# Patient Record
Sex: MALE | Race: WHITE | Employment: OTHER | URBAN - METROPOLITAN AREA
[De-identification: names, ages, dates, MRNs, and addresses within clinical notes are randomized per-mention and may not be internally consistent; named-entity substitution may affect disease eponyms.]

---

## 2017-01-12 RX ORDER — LISINOPRIL 20 MG/1
20 TABLET ORAL DAILY
Qty: 90 TAB | Refills: 3 | Status: SHIPPED | OUTPATIENT
Start: 2017-01-12 | End: 2018-01-15 | Stop reason: SDUPTHER

## 2017-02-22 ENCOUNTER — OFFICE VISIT (OUTPATIENT)
Dept: FAMILY MEDICINE CLINIC | Age: 82
End: 2017-02-22

## 2017-02-22 VITALS
WEIGHT: 162 LBS | SYSTOLIC BLOOD PRESSURE: 111 MMHG | DIASTOLIC BLOOD PRESSURE: 68 MMHG | HEIGHT: 67 IN | TEMPERATURE: 98.2 F | BODY MASS INDEX: 25.43 KG/M2 | RESPIRATION RATE: 16 BRPM | HEART RATE: 67 BPM

## 2017-02-22 DIAGNOSIS — J06.9 VIRAL UPPER RESPIRATORY TRACT INFECTION: ICD-10-CM

## 2017-02-22 DIAGNOSIS — R53.83 MALAISE AND FATIGUE: ICD-10-CM

## 2017-02-22 DIAGNOSIS — R53.81 MALAISE AND FATIGUE: ICD-10-CM

## 2017-02-22 DIAGNOSIS — R05.9 COUGH: Primary | ICD-10-CM

## 2017-02-22 LAB
QUICKVUE INFLUENZA TEST: NEGATIVE
VALID INTERNAL CONTROL?: YES

## 2017-02-22 RX ORDER — HYDROCODONE POLISTIREX AND CHLORPHENIRAMINE POLISTIREX 10; 8 MG/5ML; MG/5ML
5 SUSPENSION, EXTENDED RELEASE ORAL
Qty: 120 ML | Refills: 0 | Status: SHIPPED | OUTPATIENT
Start: 2017-02-22 | End: 2017-06-21 | Stop reason: ALTCHOICE

## 2017-02-22 NOTE — PATIENT INSTRUCTIONS
Cough: Care Instructions  Your Care Instructions  A cough is your body's response to something that bothers your throat or airways. Many things can cause a cough. You might cough because of a cold or the flu, bronchitis, or asthma. Smoking, postnasal drip, allergies, and stomach acid that backs up into your throat also can cause coughs. A cough is a symptom, not a disease. Most coughs stop when the cause, such as a cold, goes away. You can take a few steps at home to cough less and feel better. Follow-up care is a key part of your treatment and safety. Be sure to make and go to all appointments, and call your doctor if you are having problems. It's also a good idea to know your test results and keep a list of the medicines you take. How can you care for yourself at home? · Drink lots of water and other fluids. This helps thin the mucus and soothes a dry or sore throat. Honey or lemon juice in hot water or tea may ease a dry cough. · Take cough medicine as directed by your doctor. · Prop up your head on pillows to help you breathe and ease a dry cough. · Try cough drops to soothe a dry or sore throat. Cough drops don't stop a cough. Medicine-flavored cough drops are no better than candy-flavored drops or hard candy. · Do not smoke. Avoid secondhand smoke. If you need help quitting, talk to your doctor about stop-smoking programs and medicines. These can increase your chances of quitting for good. When should you call for help? Call 911 anytime you think you may need emergency care. For example, call if:  · You have severe trouble breathing. Call your doctor now or seek immediate medical care if:  · You cough up blood. · You have new or worse trouble breathing. · You have a new or higher fever. · You have a new rash.   Watch closely for changes in your health, and be sure to contact your doctor if:  · You cough more deeply or more often, especially if you notice more mucus or a change in the color of your mucus. · You have new symptoms, such as a sore throat, an earache, or sinus pain. · You do not get better as expected. Where can you learn more? Go to http://sotero-richie.info/. Enter D279 in the search box to learn more about \"Cough: Care Instructions. \"  Current as of: May 27, 2016  Content Version: 11.1  © 2006-2016 [employer]. Care instructions adapted under license by [employer] (which disclaims liability or warranty for this information). If you have questions about a medical condition or this instruction, always ask your healthcare professional. [place] [address] any warranty or liability for your use of this information. Fatigue: Care Instructions  Your Care Instructions  Fatigue is a feeling of tiredness, exhaustion, or lack of energy. You may feel fatigue because of too much or not enough activity. It can also come from stress, lack of sleep, boredom, and poor diet. Many medical problems, such as viral infections, can cause fatigue. Emotional problems, especially depression, are often the cause of fatigue. Fatigue is most often a symptom of another problem. Treatment for fatigue depends on the cause. For example, if you have fatigue because you have a certain health problem, treating this problem also treats your fatigue. If depression or anxiety is the cause, treatment may help. Follow-up care is a key part of your treatment and safety. Be sure to make and go to all appointments, and call your doctor if you are having problems. It's also a good idea to know your test results and keep a list of the medicines you take. How can you care for yourself at home? · Get regular exercise. But don't overdo it. Go back and forth between rest and exercise. · Get plenty of rest.  · Eat a healthy diet. Do not skip meals, especially breakfast.  · Reduce your use of caffeine, tobacco, and alcohol.  Caffeine is most often found in coffee, tea, cola drinks, and chocolate. · Limit medicines that can cause fatigue. This includes tranquilizers and cold and allergy medicines. When should you call for help? Watch closely for changes in your health, and be sure to contact your doctor if:  · You have new symptoms such as fever or a rash. · Your fatigue gets worse. · You have been feeling down, depressed, or hopeless. Or you may have lost interest in things that you usually enjoy. · You are not getting better as expected. Where can you learn more? Go to http://sotero-richie.info/. Enter Y107 in the search box to learn more about \"Fatigue: Care Instructions. \"  Current as of: May 27, 2016  Content Version: 11.1  © 0097-0229 [employer]. Care instructions adapted under license by [employer] (which disclaims liability or warranty for this information). If you have questions about a medical condition or this instruction, always ask your healthcare professional. [place] [address] any warranty or liability for your use of this information.

## 2017-02-22 NOTE — PROGRESS NOTES
1. Have you been to the ER, urgent care clinic since your last visit? Hospitalized since your last visit? No.     2. Have you seen or consulted any other health care providers outside of the [facility] since your last visit? Include any pap smears or colon screening. No.    Patient presents with chesy congestion and cough.

## 2017-02-22 NOTE — PROGRESS NOTES
Chief Complaint   Patient presents with    Nasal Congestion    Cough     HPI:    This is a 81 y/o  patient who presents for the above reason. Started yesterday morning fever with body ache , coughing,  slight headache and chest congestion. Slept poorly last night but states he feel much better today but still coughing persistently. ROS: pertinent positives as noted in HPI. All others were negative      Past Medical History:   Diagnosis Date    BPH (benign prostatic hypertrophy)     Carpal tunnel syndrome     Hypertension      Social History     Social History    Marital status:      Spouse name: N/A    Number of children: N/A    Years of education: N/A     Occupational History    Not on file. Social History Main Topics    Smoking status: Former Smoker     Years: 15.00     Types: Cigarettes     Quit date: 1/1/1965    Smokeless tobacco: Never Used    Alcohol use 0.0 - 3.0 oz/week     0 - 6 Glasses of wine per week    Drug use: No    Sexual activity: No     Other Topics Concern    Not on file     Social History Narrative     Current Outpatient Prescriptions   Medication Sig Dispense Refill    MYRBETRIQ 50 mg ER tablet take 1 tablet by mouth once daily 30 Tab 2    lisinopril (PRINIVIL, ZESTRIL) 20 mg tablet Take 1 Tab by mouth daily. 90 Tab 3    tamsulosin (FLOMAX) 0.4 mg capsule Take 1 Cap by mouth daily (after dinner). 90 Cap 1    amLODIPine (NORVASC) 10 mg tablet Take 1 Tab by mouth daily. 90 Tab 3    fluticasone (FLONASE) 50 mcg/actuation nasal spray 1 [place] by Both Nostrils route daily. 1 Bottle 6    hydrochlorothiazide (HYDRODIURIL) 25 mg tablet Take 25 mg by mouth daily.  ibuprofen 200 mg cap Take 400 mg by mouth two (2) times a day.  aspirin delayed-release 81 mg tablet Take  by mouth daily.  omeprazole (PRILOSEC) 20 mg capsule Take 20 mg by mouth daily.        No Known Allergies    Physical Exam:    Vital Signs:   Visit Vitals    /68    Pulse 67    Temp 98.2 °F (36.8 °C) (Oral)    Resp 16    Ht 5' 7\" (1.702 m)    Wt 162 lb (73.5 kg)    BMI 25.37 kg/m2         General: a, a & o x 3, afebrile,  interacting appropriately, in no acute distress  HEENT: head normocephalic and atraumatic, conjuctiva clear,  Pharynx and tonsils with no erythema or exudates  Respiratory: lung sounds clear bilaterally, good respiratory effort, no wheezes or crackles  Cardiovascular: normal S1S2, regular rate and rhythm, no murmurs      Assessment/Plan:      ICD-10-CM ICD-9-CM    1. Cough R05 786.2 chlorpheniramine-HYDROcodone (TUSSIONEX) 10-8 mg/5 mL suspension   2. Viral upper respiratory tract infection J06.9 465.9 Plenty of rest and keep hydrated    B97.89     3. Malaise and fatigue R53.81 780.79 AMB POC RAPID INFLUENZA TEST - negative    R53.83           Additional Notes: Discussed today's diagnosis, treatment plans. Discussed medication indications and side effects. After Visit Summary: Provided and discussed printed patient instructions. Answered questions in relation to today's diagnosis.   Follow-up Disposition: Follow up as needed if symptoms worsen or fail to improve        Jose E Duncan NP-BC  Family Medicine  [facility] [place]          Orders Placed This Encounter    AMB POC RAPID INFLUENZA TEST    chlorpheniramine-HYDROcodone (TUSSIONEX) 10-8 mg/5 mL suspension

## 2017-02-22 NOTE — MR AVS SNAPSHOT
Visit Information Date & Time Provider Department Dept. Phone Encounter #  
 2/22/2017  2:15 PM Dat Smith NP [facility] [id number] Follow-up Instructions Return if symptoms worsen or fail to improve. Your Appointments 6/12/2017 11:00 AM  
Follow Up with Jacqui Santana MD  
[facility] [address] ([address] [facility]) Appt Note: 6 month f/u  
 [facility] [place] [facility] [address]  
  
   
 [address] [facility]  
  
    
 6/12/2017  2:15 PM  
ESTABLISHED PATIENT with Ingrid Eugene MD  
[facility] [address] [facility]) Appt Note: Return in about 6 months (around 6/12/2017) for PVR, AUA score. [address] [facility], [place] [address] [facility] [address]  
  
   
 [address] [facility], [address] [facility] [address] Upcoming Health Maintenance Date Due DTaP/Tdap/Td series (1 - Tdap) 7/10/1953 ZOSTER VACCINE AGE 60> 7/10/1992 GLAUCOMA SCREENING Q2Y 7/10/1997 Pneumococcal 65+ Low/Medium Risk (1 of 2 - PCV13) 7/10/1997 MEDICARE YEARLY EXAM 12/15/2017 Allergies as of 2/22/2017  Review Complete On: 2/22/2017 By: Amber Lopez No Known Allergies Current Immunizations  Reviewed on 12/14/2016 Name Date Influenza High Dose Vaccine PF 9/22/2016 Not reviewed this visit You Were Diagnosed With   
  
 Codes Comments Cough    -  Primary ICD-10-CM: U45 ICD-9-CM: 366. 2 Viral upper respiratory tract infection     ICD-10-CM: J06.9, B97.89 ICD-9-CM: 465.9 Malaise and fatigue     ICD-10-CM: R53.81, R53.83 ICD-9-CM: 780.79 Vitals BP  
  
  
  
  
  
 111/68 Vitals History BMI and BSA Data Body Mass Index Body Surface Area  
 25.37 kg/m 2 1.86 m 2 Preferred Pharmacy Pharmacy Name Phone  [id number] [facility], [address] [facility] [address] [phone] Your Updated Medication List  
  
   
This list is accurate as of: 2/22/17  2:58 PM.  Always use your most recent med list. amLODIPine 10 mg tablet Commonly known as:  Shu Fraction Take 1 Tab by mouth daily. aspirin delayed-release 81 mg tablet Take  by mouth daily. chlorpheniramine-HYDROcodone 10-8 mg/5 mL suspension Commonly known as:  Keegan [place] Take 5 mL by mouth every twelve (12) hours as needed for Cough. Max Daily Amount: 10 mL. fluticasone 50 mcg/actuation nasal spray Commonly known as:  FLONASE  
1 [place] by Both Nostrils route daily. hydroCHLOROthiazide 25 mg tablet Commonly known as:  HYDRODIURIL Take 25 mg by mouth daily. ibuprofen 200 mg Cap Take 400 mg by mouth two (2) times a day. lisinopril 20 mg tablet Commonly known as:  Sahni [place] Take 1 Tab by mouth daily. MYRBETRIQ 50 mg ER tablet Generic drug:  mirabegron ER  
take 1 tablet by mouth once daily PriLOSEC 20 mg capsule Generic drug:  omeprazole Take 20 mg by mouth daily. tamsulosin 0.4 mg capsule Commonly known as:  FLOMAX Take 1 Cap by mouth daily (after dinner). Prescriptions Printed Refills  
 chlorpheniramine-HYDROcodone (TUSSIONEX) 10-8 mg/5 mL suspension 0 Sig: Take 5 mL by mouth every twelve (12) hours as needed for Cough. Max Daily Amount: 10 mL. Class: Print Route: Oral  
  
We Performed the Following AMB POC RAPID INFLUENZA TEST [95006 CPT(R)] Follow-up Instructions Return if symptoms worsen or fail to improve. Patient Instructions Cough: Care Instructions Your Care Instructions A cough is your body's response to something that bothers your throat or airways. Many things can cause a cough. You might cough because of a cold or the flu, bronchitis, or asthma.  Smoking, postnasal drip, allergies, and stomach acid that backs up into your throat also can cause coughs. A cough is a symptom, not a disease. Most coughs stop when the cause, such as a cold, goes away. You can take a few steps at home to cough less and feel better. Follow-up care is a key part of your treatment and safety. Be sure to make and go to all appointments, and call your doctor if you are having problems. It's also a good idea to know your test results and keep a list of the medicines you take. How can you care for yourself at home? · Drink lots of water and other fluids. This helps thin the mucus and soothes a dry or sore throat. Honey or lemon juice in hot water or tea may ease a dry cough. · Take cough medicine as directed by your doctor. · Prop up your head on pillows to help you breathe and ease a dry cough. · Try cough drops to soothe a dry or sore throat. Cough drops don't stop a cough. Medicine-flavored cough drops are no better than candy-flavored drops or hard candy. · Do not smoke. Avoid secondhand smoke. If you need help quitting, talk to your doctor about stop-smoking programs and medicines. These can increase your chances of quitting for good. When should you call for help? Call 911 anytime you think you may need emergency care. For example, call if: 
· You have severe trouble breathing. Call your doctor now or seek immediate medical care if: 
· You cough up blood. · You have new or worse trouble breathing. · You have a new or higher fever. · You have a new rash. Watch closely for changes in your health, and be sure to contact your doctor if: 
· You cough more deeply or more often, especially if you notice more mucus or a change in the color of your mucus. · You have new symptoms, such as a sore throat, an earache, or sinus pain. · You do not get better as expected. Where can you learn more? Go to http://sotero-richie.info/.  
Enter D2 in the search box to learn more about \"Cough: Care Instructions. \" Current as of: May 27, 2016 Content Version: 11.1 © 6596-1566 [employer]. Care instructions adapted under license by [employer]igo (which disclaims liability or warranty for this information). If you have questions about a medical condition or this instruction, always ask your healthcare professional. [address] any warranty or liability for your use of this information. Fatigue: Care Instructions Your Care Instructions Fatigue is a feeling of tiredness, exhaustion, or lack of energy. You may feel fatigue because of too much or not enough activity. It can also come from stress, lack of sleep, boredom, and poor diet. Many medical problems, such as viral infections, can cause fatigue. Emotional problems, especially depression, are often the cause of fatigue. Fatigue is most often a symptom of another problem. Treatment for fatigue depends on the cause. For example, if you have fatigue because you have a certain health problem, treating this problem also treats your fatigue. If depression or anxiety is the cause, treatment may help. Follow-up care is a key part of your treatment and safety. Be sure to make and go to all appointments, and call your doctor if you are having problems. It's also a good idea to know your test results and keep a list of the medicines you take. How can you care for yourself at home? · Get regular exercise. But don't overdo it. Go back and forth between rest and exercise. · Get plenty of rest. 
· Eat a healthy diet. Do not skip meals, especially breakfast. 
· Reduce your use of caffeine, tobacco, and alcohol. Caffeine is most often found in coffee, tea, cola drinks, and chocolate. · Limit medicines that can cause fatigue. This includes tranquilizers and cold and allergy medicines. When should you call for help? Watch closely for changes in your health, and be sure to contact your doctor if: · You have new symptoms such as fever or a rash. · Your fatigue gets worse. · You have been feeling down, depressed, or hopeless. Or you may have lost interest in things that you usually enjoy. · You are not getting better as expected. Where can you learn more? Go to http://sotero-richie.info/. Enter T408 in the search box to learn more about \"Fatigue: Care Instructions. \" Current as of: May 27, 2016 Content Version: 11.1 © 1266-3363 [employer]. Care instructions adapted under license by [employer] (which disclaims liability or warranty for this information). If you have questions about a medical condition or this instruction, always ask your healthcare professional. [address] any warranty or liability for your use of this information. Introducing [facility]! Miguel Bennett introduces [employer] patient portal. Now you can access parts of your medical record, email your doctor's office, and request medication refills online. 1. In your internet browser, go to [email or web address] 2. Click on the First Time User? Click Here link in the Sign In box. You will see the New Member Sign Up page. 3. Enter your [employer] Access Code exactly as it appears below. You will not need to use this code after youve completed the sign-up process. If you do not sign up before the expiration date, you must request a new code. · [employer] Access Code: [email or web address] Expires: 5/23/2017  2:58 PM 
 
4. Enter the last four digits of your Social Security Number (xxxx) and Date of Birth (mm/dd/yyyy) as indicated and click Submit. You will be taken to the next sign-up page. 5. Create a [employer] ID. This will be your [employer] login ID and cannot be changed, so think of one that is secure and easy to remember. 6. Create a Fundriset password. You can change your password at any time. 7. Enter your Password Reset Question and Answer. This can be used at a later time if you forget your password. 8. Enter your e-mail address. You will receive e-mail notification when new information is available in [address] 9. Click Sign Up. You can now view and download portions of your medical record. 10. Click the Download Summary menu link to download a portable copy of your medical information. If you have questions, please visit the Frequently Asked Questions section of the emaze website. Remember, emaze is NOT to be used for urgent needs. For medical emergencies, dial 911. Now available from your iPhone and Android! Please provide this summary of care documentation to your next provider. Your primary care clinician is listed as Kamini Mcnulty. If you have any questions after today's visit, please call [phone].

## 2017-02-24 ENCOUNTER — OFFICE VISIT (OUTPATIENT)
Dept: FAMILY MEDICINE CLINIC | Age: 82
End: 2017-02-24

## 2017-02-24 VITALS
OXYGEN SATURATION: 90 % | SYSTOLIC BLOOD PRESSURE: 104 MMHG | DIASTOLIC BLOOD PRESSURE: 64 MMHG | TEMPERATURE: 99.8 F | WEIGHT: 163 LBS | RESPIRATION RATE: 16 BRPM | BODY MASS INDEX: 25.58 KG/M2 | HEIGHT: 67 IN | HEART RATE: 78 BPM

## 2017-02-24 DIAGNOSIS — R05.9 COUGH: ICD-10-CM

## 2017-02-24 DIAGNOSIS — J02.9 SORE THROAT: ICD-10-CM

## 2017-02-24 DIAGNOSIS — J02.0 STREP THROAT: ICD-10-CM

## 2017-02-24 DIAGNOSIS — J11.1 INFLUENZA: Primary | ICD-10-CM

## 2017-02-24 DIAGNOSIS — R50.9 FEVER AND CHILLS: ICD-10-CM

## 2017-02-24 LAB
BILIRUB UR QL STRIP: NEGATIVE
GLUCOSE UR-MCNC: NEGATIVE MG/DL
KETONES P FAST UR STRIP-MCNC: NEGATIVE MG/DL
PH UR STRIP: 5 [PH] (ref 4.6–8)
PROT UR QL STRIP: NEGATIVE MG/DL
QUICKVUE INFLUENZA TEST: POSITIVE
S PYO AG THROAT QL: POSITIVE
SP GR UR STRIP: 1.02 (ref 1–1.03)
UA UROBILINOGEN AMB POC: NORMAL (ref 0.2–1)
URINALYSIS CLARITY POC: CLEAR
URINALYSIS COLOR POC: YELLOW
URINE BLOOD POC: NEGATIVE
URINE LEUKOCYTES POC: NEGATIVE
URINE NITRITES POC: NEGATIVE
VALID INTERNAL CONTROL?: YES
VALID INTERNAL CONTROL?: YES

## 2017-02-24 RX ORDER — CEFDINIR 300 MG/1
300 CAPSULE ORAL 2 TIMES DAILY
Qty: 20 CAP | Refills: 0 | Status: SHIPPED | OUTPATIENT
Start: 2017-02-24 | End: 2017-03-06

## 2017-02-24 RX ORDER — OSELTAMIVIR PHOSPHATE 75 MG/1
75 CAPSULE ORAL 2 TIMES DAILY
Qty: 10 CAP | Refills: 0 | Status: SHIPPED | OUTPATIENT
Start: 2017-02-24 | End: 2017-03-01

## 2017-02-24 NOTE — PROGRESS NOTES
1. Have you been to the ER, urgent care clinic since your last visit? Hospitalized since your last visit? No.     2. Have you seen or consulted any other health care providers outside of the [facility] since your last visit? Include any pap smears or colon screening. No.    Patient presents with follow up cough and fever. He is still not feeling any better, still coughing, fever, slight body aches and sore throat.

## 2017-02-24 NOTE — PROGRESS NOTES
.  Chief Complaint   Patient presents with    Follow Up Chronic Condition    Cough    Fever     HPI:    This is a 81 y/o  patient who presents for the above reason. He was seen for this 2 days ago  Symptoms have worsen and have been having fever with chill. Complain of ongoing cough and generalized body ache. No SOB or chest pain. But patient in no respiratory distress but oxygen saturation was low to 88 % but finally came up to 99 %. Plan to obtain CXR to r/o pneumonia. ROS: pertinent positives as noted in HPI. All others were negative      Past Medical History:   Diagnosis Date    BPH (benign prostatic hypertrophy)     Carpal tunnel syndrome     Hypertension      Past Surgical History:   Procedure Laterality Date    HX APPENDECTOMY  1942    HX CARPAL TUNNEL RELEASE       Family History   Problem Relation Age of Onset    Family history unknown: Yes       Social History     Social History    Marital status:      Spouse name: N/A    Number of children: N/A    Years of education: N/A     Occupational History    Not on file. Social History Main Topics    Smoking status: Former Smoker     Years: 15.00     Types: Cigarettes     Quit date: 1/1/1965    Smokeless tobacco: Never Used    Alcohol use 0.0 - 3.0 oz/week     0 - 6 Glasses of wine per week    Drug use: No    Sexual activity: No     Other Topics Concern    Not on file     Social History Narrative     Current Outpatient Prescriptions   Medication Sig Dispense Refill    chlorpheniramine-HYDROcodone (TUSSIONEX) 10-8 mg/5 mL suspension Take 5 mL by mouth every twelve (12) hours as needed for Cough. Max Daily Amount: 10 mL. 120 mL 0    MYRBETRIQ 50 mg ER tablet take 1 tablet by mouth once daily 30 Tab 2    lisinopril (PRINIVIL, ZESTRIL) 20 mg tablet Take 1 Tab by mouth daily. 90 Tab 3    tamsulosin (FLOMAX) 0.4 mg capsule Take 1 Cap by mouth daily (after dinner).  90 Cap 1    amLODIPine (NORVASC) 10 mg tablet Take 1 Tab by mouth daily. 90 Tab 3    fluticasone (FLONASE) 50 mcg/actuation nasal spray 1 [place] by Both Nostrils route daily. 1 Bottle 6    hydrochlorothiazide (HYDRODIURIL) 25 mg tablet Take 25 mg by mouth daily.  ibuprofen 200 mg cap Take 400 mg by mouth two (2) times a day.  omeprazole (PRILOSEC) 20 mg capsule Take 20 mg by mouth daily.  aspirin delayed-release 81 mg tablet Take  by mouth daily. No Known Allergies    Physical Exam:    Vital Signs:     Visit Vitals    /64    Pulse 75    Temp 99.8 °F (37.7 °C) (Oral)    Resp 16    Ht 5' 7\" (1.702 m)    Wt 163 lb (73.9 kg)    SpO2 90%    BMI 25.53 kg/m2         General: a, a & o x 3, afebrile,  interacting appropriately, in no acute distress  HEENT: head normocephalic and atraumatic, conjuctiva clear. Pharynx and tonsils with no erythema or exudates  Respiratory: lung sounds clear bilaterally, good respiratory effort, no wheezes or crackles  Cardiovascular: normal S1S2, regular rate and rhythm, no murmurs   Skin: No rash or lesion. Skin warm and dry to touch. Musculoskeletal: Normal ROM. No deformity      Assessment/Plan:      ICD-10-CM ICD-9-CM    1. Influenza J11.1 487.1 oseltamivir (TAMIFLU) 75 mg capsule   2. Strep throat J02.0 034.0 cefdinir (OMNICEF) 300 mg capsule   3. Fever and chills R50.9 780.60 AMB POC RAPID STREP A- positive      AMB POC RAPID INFLUENZA TEST- positive      XR CHEST PA LAT - negative for pneumonia      AMB POC URINALYSIS DIP STICK AUTO W/O MICRO      cefdinir (OMNICEF) 300 mg capsule   4. Sore throat J02.9 462    5. Cough R05 786.2 XR CHEST PA LAT - negative           Additional Notes: Discussed today's diagnosis, treatment plans. Discussed medication indications and side effects. After Visit Summary: Provided and discussed printed patient instructions. Answered questions in relation to today's diagnosis.   Follow-up Disposition: Follow up  3 days        Tolu Cotton, NP-BC  [facility]        Orders Placed This Encounter    XR CHEST PA LAT    AMB POC RAPID STREP A    AMB POC RAPID INFLUENZA TEST    AMB POC URINALYSIS DIP STICK AUTO W/O MICRO    oseltamivir (TAMIFLU) 75 mg capsule    cefdinir (OMNICEF) 300 mg capsule

## 2017-02-24 NOTE — MR AVS SNAPSHOT
Visit Information Date & Time Provider Department Dept. Phone Encounter #  
 2/24/2017 10:45 AM Musa Nicholas NP [facility] [id number] Follow-up Instructions Return in about 3 days (around 2/27/2017). Your Appointments 6/12/2017 11:00 AM  
Follow Up with MD Apurva Conway 23 ([address]) Appt Note: 6 month f/u  
 [facility] [place] [facility] [address]  
  
   
 [address] [facility]  
  
    
 6/12/2017  2:15 PM  
ESTABLISHED PATIENT with Kinza Hassan MD  
[facility]) Appt Note: Return in about 6 months (around 6/12/2017) for PVR, AUA score. [address], [place] [address]  
  
   
 [address], [address] [facility] [address] Upcoming Health Maintenance Date Due DTaP/Tdap/Td series (1 - Tdap) 7/10/1953 ZOSTER VACCINE AGE 60> 7/10/1992 GLAUCOMA SCREENING Q2Y 7/10/1997 Pneumococcal 65+ Low/Medium Risk (1 of 2 - PCV13) 7/10/1997 MEDICARE YEARLY EXAM 12/15/2017 Allergies as of 2/24/2017  Review Complete On: 2/24/2017 By: Yobani Euceda No Known Allergies Current Immunizations  Reviewed on 12/14/2016 Name Date Influenza High Dose Vaccine PF 9/22/2016 Not reviewed this visit You Were Diagnosed With   
  
 Codes Comments Fever and chills    -  Primary ICD-10-CM: R50.9 ICD-9-CM: 780.60 Strep throat     ICD-10-CM: J02.0 ICD-9-CM: 034.0 Influenza     ICD-10-CM: J11.1 ICD-9-CM: 487. 1 Vitals BP  
  
  
  
  
  
 104/64 Vitals History BMI and BSA Data Body Mass Index Body Surface Area 25.53 kg/m 2 1.87 m 2 Preferred Pharmacy Pharmacy Name Phone [id number], [address] [facility] [address] [phone] Your Updated Medication List  
  
 This list is accurate as of: 2/24/17 11:51 AM.  Always use your most recent med list. amLODIPine 10 mg tablet Commonly known as:  Tyson Brush Take 1 Tab by mouth daily. aspirin delayed-release 81 mg tablet Take  by mouth daily. cefdinir 300 mg capsule Commonly known as:  OMNICEF Take 1 Cap by mouth two (2) times a day for 10 days. chlorpheniramine-HYDROcodone 10-8 mg/5 mL suspension Commonly known as:  Andrew Randle Take 5 mL by mouth every twelve (12) hours as needed for Cough. Max Daily Amount: 10 mL. fluticasone 50 mcg/actuation nasal spray Commonly known as:  FLONASE  
1 [place] by Both Nostrils route daily. hydroCHLOROthiazide 25 mg tablet Commonly known as:  HYDRODIURIL Take 25 mg by mouth daily. ibuprofen 200 mg Cap Take 400 mg by mouth two (2) times a day. lisinopril 20 mg tablet Commonly known as:  Kaila Pate Take 1 Tab by mouth daily. MYRBETRIQ 50 mg ER tablet Generic drug:  mirabegron ER  
take 1 tablet by mouth once daily  
  
 oseltamivir 75 mg capsule Commonly known as:  TAMIFLU Take 1 Cap by mouth two (2) times a day for 5 days. PriLOSEC 20 mg capsule Generic drug:  omeprazole Take 20 mg by mouth daily. tamsulosin 0.4 mg capsule Commonly known as:  FLOMAX Take 1 Cap by mouth daily (after dinner). Prescriptions Sent to Pharmacy Refills  
 oseltamivir (TAMIFLU) 75 mg capsule 0 Sig: Take 1 Cap by mouth two (2) times a day for 5 days. Class: Normal  
 Pharmacy: [address] Ph #: [phone] Route: Oral  
 cefdinir (OMNICEF) 300 mg capsule 0 Sig: Take 1 Cap by mouth two (2) times a day for 10 days. Class: Normal  
 Pharmacy: [address] Ph #: [phone] Route: Oral  
  
We Performed the Following AMB POC RAPID INFLUENZA TEST [39740 CPT(R)] AMB POC RAPID STREP A [95743 CPT(R)] AMB POC URINALYSIS DIP STICK AUTO W/O MICRO [29935 CPT(R)] Follow-up Instructions Return in about 3 days (around 2/27/2017). To-Do List   
 02/24/2017 Imaging:  XR CHEST PA LAT Patient Instructions Influenza (Flu): Care Instructions Your Care Instructions Influenza (flu) is an infection in the lungs and breathing passages. It is caused by the influenza virus. There are different strains, or types, of the flu virus from year to year. Unlike the common cold, the flu comes on suddenly and the symptoms, such as a cough, congestion, fever, chills, fatigue, aches, and pains, are more severe. These symptoms may last up to 10 days. Although the flu can make you feel very sick, it usually doesn't cause serious health problems. Home treatment is usually all you need for flu symptoms. But your doctor may prescribe antiviral medicine to prevent other health problems, such as pneumonia, from developing. Older people and those who have a long-term health condition, such as lung disease, are most at risk for having pneumonia or other health problems. Follow-up care is a key part of your treatment and safety. Be sure to make and go to all appointments, and call your doctor if you are having problems. Its also a good idea to know your test results and keep a list of the medicines you take. How can you care for yourself at home? · Get plenty of rest. 
· Drink plenty of fluids, enough so that your urine is light yellow or clear like water. If you have kidney, heart, or liver disease and have to limit fluids, talk with your doctor before you increase the amount of fluids you drink. · Take an over-the-counter pain medicine if needed, such as acetaminophen (Tylenol), ibuprofen (Advil, Motrin), or naproxen (Aleve), to relieve fever, headache, and muscle aches.  Read and follow all instructions on the label. No one younger than 20 should take aspirin. It has been linked to Reye syndrome, a serious illness. · Do not smoke. Smoking can make the flu worse. If you need help quitting, talk to your doctor about stop-smoking programs and medicines. These can increase your chances of quitting for good. · Breathe moist air from a hot shower or from a sink filled with hot water to help clear a stuffy nose. · Before you use cough and cold medicines, check the label. These medicines may not be safe for young children or for people with certain health problems. · If the skin around your nose and lips becomes sore, put some petroleum jelly on the area. · To ease coughing: ¨ Drink fluids to soothe a scratchy throat. ¨ Suck on cough drops or plain hard candy. ¨ Take an over-the-counter cough medicine that contains dextromethorphan to help you get some sleep. Read and follow all instructions on the label. ¨ Raise your head at night with an extra pillow. This may help you rest if coughing keeps you awake. · Take any prescribed medicine exactly as directed. Call your doctor if you think you are having a problem with your medicine. To avoid spreading the flu · Wash your hands regularly, and keep your hands away from your face. · Stay home from school, work, and other public places until you are feeling better and your fever has been gone for at least 24 hours. The fever needs to have gone away on its own without the help of medicine. · Ask people living with you to talk to their doctors about preventing the flu. They may get antiviral medicine to keep from getting the flu from you. · To prevent the flu in the future, get a flu vaccine every fall. Encourage people living with you to get the vaccine. · Cover your mouth when you cough or sneeze. When should you call for help? Call 911 anytime you think you may need emergency care. For example, call if: 
· You have severe trouble breathing. Call your doctor now or seek immediate medical care if: 
· You have new or worse trouble breathing. · You seem to be getting much sicker. · You feel very sleepy or confused. · You have a new or higher fever. · You get a new rash. Watch closely for changes in your health, and be sure to contact your doctor if: 
· You begin to get better and then get worse. · You are not getting better after 1 week. Where can you learn more? Go to http://sotero-richie.info/. Enter V192 in the search box to learn more about \"Influenza (Flu): Care Instructions. \" Current as of: May 23, 2016 Content Version: 11.1 © 3428-3434 [employer]. Care instructions adapted under license by [employer] (which disclaims liability or warranty for this information). If you have questions about a medical condition or this instruction, always ask your healthcare professional. [place] [address] any warranty or liability for your use of this information. Sore Throat: Care Instructions Your Care Instructions Infection by bacteria or a virus causes most sore throats. Cigarette smoke, dry air, air pollution, allergies, and yelling can also cause a sore throat. Sore throats can be painful and annoying. Fortunately, most sore throats go away on their own. If you have a bacterial infection, your doctor may prescribe antibiotics. Follow-up care is a key part of your treatment and safety. Be sure to make and go to all appointments, and call your doctor if you are having problems. It's also a good idea to know your test results and keep a list of the medicines you take. How can you care for yourself at home? · If your doctor prescribed antibiotics, take them as directed. Do not stop taking them just because you feel better. You need to take the full course of antibiotics.  
· Gargle with warm salt water once an hour to help reduce swelling and relieve discomfort. Use 1 teaspoon of salt mixed in 1 cup of warm water. · Take an over-the-counter pain medicine, such as acetaminophen (Tylenol), ibuprofen (Advil, Motrin), or naproxen (Aleve). Read and follow all instructions on the label. · Be careful when taking over-the-counter cold or flu medicines and Tylenol at the same time. Many of these medicines have acetaminophen, which is Tylenol. Read the labels to make sure that you are not taking more than the recommended dose. Too much acetaminophen (Tylenol) can be harmful. · Drink plenty of fluids. Fluids may help soothe an irritated throat. Hot fluids, such as tea or soup, may help decrease throat pain. · Use over-the-counter throat lozenges to soothe pain. Regular cough drops or hard candy may also help. These should not be given to young children because of the risk of choking. · Do not smoke or allow others to smoke around you. If you need help quitting, talk to your doctor about stop-smoking programs and medicines. These can increase your chances of quitting for good. · Use a vaporizer or humidifier to add moisture to your bedroom. Follow the directions for cleaning the machine. When should you call for help? Call your doctor now or seek immediate medical care if: 
· You have new or worse trouble swallowing. · Your sore throat gets much worse on one side. Watch closely for changes in your health, and be sure to contact your doctor if you do not get better as expected. Where can you learn more? Go to http://sotero-richie.info/. Enter 062 [id number] [phone] in the search box to learn more about \"Sore Throat: Care Instructions. \" Current as of: July 29, 2016 Content Version: 11.1 © 8641-2982 [employer]. Care instructions adapted under license by [employer] (which disclaims liability or warranty for this information).  If you have questions about a medical condition or this instruction, always ask your healthcare professional. [place] [address] any warranty or liability for your use of this information. Introducing [facility]! [address] introduces [employer] patient portal. Now you can access parts of your medical record, email your doctor's office, and request medication refills online. 1. In your internet browser, go to [email or web address] 2. Click on the First Time User? Click Here link in the Sign In box. You will see the New Member Sign Up page. 3. Enter your [employer] Access Code exactly as it appears below. You will not need to use this code after youve completed the sign-up process. If you do not sign up before the expiration date, you must request a new code. · [employer] Access Code: [email or web address] Expires: 5/23/2017  2:58 PM 
 
4. Enter the last four digits of your Social Security Number (xxxx) and Date of Birth (mm/dd/yyyy) as indicated and click Submit. You will be taken to the next sign-up page. 5. Create a [employer] ID. This will be your [employer] login ID and cannot be changed, so think of one that is secure and easy to remember. 6. Create a [employer] password. You can change your password at any time. 7. Enter your Password Reset Question and Answer. This can be used at a later time if you forget your password. 8. Enter your e-mail address. You will receive e-mail notification when new information is available in [address]. 9. Click Sign Up. You can now view and download portions of your medical record. 10. Click the Download Summary menu link to download a portable copy of your medical information. If you have questions, please visit the Frequently Asked Questions section of the [employer] website. Remember, [employer] is NOT to be used for urgent needs. For medical emergencies, dial 911. Now available from your iPhone and Android! Please provide this summary of care documentation to your next provider. Your primary care clinician is listed as Val[facility]. If you have any questions after today's visit, please call [phone].

## 2017-02-27 ENCOUNTER — OFFICE VISIT (OUTPATIENT)
Dept: FAMILY MEDICINE CLINIC | Age: 82
End: 2017-02-27

## 2017-02-27 VITALS
RESPIRATION RATE: 16 BRPM | HEART RATE: 75 BPM | BODY MASS INDEX: 25.27 KG/M2 | WEIGHT: 161 LBS | DIASTOLIC BLOOD PRESSURE: 79 MMHG | TEMPERATURE: 97.7 F | HEIGHT: 67 IN | SYSTOLIC BLOOD PRESSURE: 130 MMHG

## 2017-02-27 DIAGNOSIS — R05.9 COUGH: ICD-10-CM

## 2017-02-27 DIAGNOSIS — R50.9 FEVER AND CHILLS: Primary | ICD-10-CM

## 2017-02-27 DIAGNOSIS — J02.0 ACUTE STREPTOCOCCAL PHARYNGITIS: ICD-10-CM

## 2017-02-27 NOTE — PROGRESS NOTES
1. Have you been to the ER, urgent care clinic since your last visit? Hospitalized since your last visit? No.     2. Have you seen or consulted any other health care providers outside of the [address] [place] [address] since your last visit? Include any pap smears or colon screening. No.    Patient presents with follow up Flu and Strep. He is still coughing up with some phlegm with blood in it.

## 2017-02-27 NOTE — PATIENT INSTRUCTIONS

## 2017-02-27 NOTE — PROGRESS NOTES
.  Chief Complaint   Patient presents with    Follow Up Chronic Condition    Flu     Strep. HPI:    Yesterday forced coughing and came up with some thing he did not like took smart a pic of oit    This is a 81 y/o  patient who presents for follow up fever and URI symptoms. Fever, sore throat resolved and feeling a lot better. Coughing have improved. Concerned he had a blood tinged brown mucous yesterday and this morning when he forced himself to cough. CXR done last week was unremarkable. No chest pain or SOB. ROS: pertinent positives as noted in HPI. All others were negative      Past Medical History:   Diagnosis Date    BPH (benign prostatic hypertrophy)     Carpal tunnel syndrome     Hypertension        Social History     Social History    Marital status:      Spouse name: N/A    Number of children: N/A    Years of education: N/A     Occupational History    Not on file. Social History Main Topics    Smoking status: Former Smoker     Years: 15.00     Types: Cigarettes     Quit date: 1/1/1965    Smokeless tobacco: Never Used    Alcohol use 0.0 - 3.0 oz/week     0 - 6 Glasses of wine per week    Drug use: No    Sexual activity: No     Other Topics Concern    Not on file     Social History Narrative     Current Outpatient Prescriptions   Medication Sig Dispense Refill    oseltamivir (TAMIFLU) 75 mg capsule Take 1 Cap by mouth two (2) times a day for 5 days. 10 Cap 0    cefdinir (OMNICEF) 300 mg capsule Take 1 Cap by mouth two (2) times a day for 10 days. 20 Cap 0    chlorpheniramine-HYDROcodone (TUSSIONEX) 10-8 mg/5 mL suspension Take 5 mL by mouth every twelve (12) hours as needed for Cough. Max Daily Amount: 10 mL. 120 mL 0    MYRBETRIQ 50 mg ER tablet take 1 tablet by mouth once daily 30 Tab 2    lisinopril (PRINIVIL, ZESTRIL) 20 mg tablet Take 1 Tab by mouth daily. 90 Tab 3    tamsulosin (FLOMAX) 0.4 mg capsule Take 1 Cap by mouth daily (after dinner).  90 Cap 1  amLODIPine (NORVASC) 10 mg tablet Take 1 Tab by mouth daily. 90 Tab 3    fluticasone (FLONASE) 50 mcg/actuation nasal spray 1 [place] by Both Nostrils route daily. 1 Bottle 6    hydrochlorothiazide (HYDRODIURIL) 25 mg tablet Take 25 mg by mouth daily.  ibuprofen 200 mg cap Take 400 mg by mouth two (2) times a day.  omeprazole (PRILOSEC) 20 mg capsule Take 20 mg by mouth daily.  aspirin delayed-release 81 mg tablet Take  by mouth daily. No Known Allergies    Physical Exam:    Vital Signs:     Visit Vitals    /79    Pulse 75    Temp 97.7 °F (36.5 °C) (Oral)    Resp 16    Ht 5' 7\" (1.702 m)    Wt 161 lb (73 kg)    BMI 25.22 kg/m2         General: a, a & o x 3, afebrile,  interacting appropriately, in no acute distress  HEENT: head normocephalic and atraumatic, conjuctiva clear. Pharynx and tonsils with no erythema or exudates  Respiratory: lung sounds clear bilaterally, good respiratory effort, no wheezes or crackles  Cardiovascular: normal S1S2, regular rate and rhythm, no murmurs   Skin: No rash or lesion. Skin warm and dry to touch. Musculoskeletal: Normal ROM. No deformity      Assessment/Plan:      ICD-10-CM ICD-9-CM    1. Fever and chills R50.9 780.60 Resolved   2. Acute streptococcal pharyngitis J02.0 034. 0 Symptoms improved. 3. Cough R05 786.2 Patient advised to continue taking cough med as prescribed. Patient reassured blood tinged mucous is possibly from forceful coughing and URI symptoms. If blood tinged mucus worsen, he was advised to return for possible chest imaging. Additional Notes: Discussed today's diagnosis, treatment plans. Discussed medication indications and side effects. After Visit Summary: Provided and discussed printed patient instructions. Answered questions in relation to today's diagnosis.   Follow-up Disposition: Follow up as needed          Aneudy Friday, NP-BC  [address] [facility],[address]

## 2017-02-27 NOTE — MR AVS SNAPSHOT
Visit Information Date & Time Provider Department Dept. Phone Encounter #  
 2/27/2017 11:30 AM Carlito Mohr, [address] [facility] [phone] [id number] Your Appointments 6/12/2017 11:00 AM  
Follow Up with La Persaud MD  
[address] ([facility]) Appt Note: 6 month f/u  
 [place]  
  
   
 [address]  
  
    
 6/12/2017  2:15 PM  
ESTABLISHED PATIENT with Mahesh Jung MD  
[facility]) Appt Note: Return in about 6 months (around 6/12/2017) for PVR, AUA score. [address], [place] [address]  
  
   
 [address], [address] [address] Upcoming Health Maintenance Date Due DTaP/Tdap/Td series (1 - Tdap) 7/10/1953 ZOSTER VACCINE AGE 60> 7/10/1992 GLAUCOMA SCREENING Q2Y 7/10/1997 Pneumococcal 65+ Low/Medium Risk (1 of 2 - PCV13) 7/10/1997 MEDICARE YEARLY EXAM 12/15/2017 Allergies as of 2/27/2017  Review Complete On: 2/27/2017 By: Nilam Valdes No Known Allergies Current Immunizations  Reviewed on 12/14/2016 Name Date Influenza High Dose Vaccine PF 9/22/2016 Not reviewed this visit You Were Diagnosed With   
  
 Codes Comments Acute streptococcal pharyngitis    -  Primary ICD-10-CM: J02.0 ICD-9-CM: 034.0 Vitals BP  
  
  
  
  
  
 130/79 Vitals History BMI and BSA Data Body Mass Index Body Surface Area  
 25.22 kg/m 2 1.86 m 2 Preferred Pharmacy Pharmacy Name Phone [id number] 914, [address] [facility] [phone] Your Updated Medication List  
  
   
This list is accurate as of: 2/27/17 11:39 AM.  Always use your most recent med list. amLODIPine 10 mg tablet Commonly known as:  Ranulfo Levine Take 1 Tab by mouth daily. aspirin delayed-release 81 mg tablet Take  by mouth daily. cefdinir 300 mg capsule Commonly known as:  OMNICEF Take 1 Cap by mouth two (2) times a day for 10 days. chlorpheniramine-HYDROcodone 10-8 mg/5 mL suspension Commonly known as:  Floyce Christelle Take 5 mL by mouth every twelve (12) hours as needed for Cough. Max Daily Amount: 10 mL. fluticasone 50 mcg/actuation nasal spray Commonly known as:  FLONASE  
1 [place] by Both Nostrils route daily. hydroCHLOROthiazide 25 mg tablet Commonly known as:  HYDRODIURIL Take 25 mg by mouth daily. ibuprofen 200 mg Cap Take 400 mg by mouth two (2) times a day. lisinopril 20 mg tablet Commonly known as:  Donnald Code Take 1 Tab by mouth daily. MYRBETRIQ 50 mg ER tablet Generic drug:  mirabegron ER  
take 1 tablet by mouth once daily  
  
 oseltamivir 75 mg capsule Commonly known as:  TAMIFLU Take 1 Cap by mouth two (2) times a day for 5 days. PriLOSEC 20 mg capsule Generic drug:  omeprazole Take 20 mg by mouth daily. tamsulosin 0.4 mg capsule Commonly known as:  FLOMAX Take 1 Cap by mouth daily (after dinner). Patient Instructions Upper Respiratory Infection (Cold): Care Instructions Your Care Instructions An upper respiratory infection, or URI, is an infection of the nose, sinuses, or throat. URIs are spread by coughs, sneezes, and direct contact. The common cold is the most frequent kind of URI. The flu and sinus infections are other kinds of URIs. Almost all URIs are caused by viruses. Antibiotics won't cure them. But you can treat most infections with home care. This may include drinking lots of fluids and taking over-the-counter pain medicine. You will probably feel better in 4 to 10 days. The doctor has checked you carefully, but problems can develop later.  If you notice any problems or new symptoms, get medical treatment right away. Follow-up care is a key part of your treatment and safety. Be sure to make and go to all appointments, and call your doctor if you are having problems. It's also a good idea to know your test results and keep a list of the medicines you take. How can you care for yourself at home? · To prevent dehydration, drink plenty of fluids, enough so that your urine is light yellow or clear like water. Choose water and other caffeine-free clear liquids until you feel better. If you have kidney, heart, or liver disease and have to limit fluids, talk with your doctor before you increase the amount of fluids you drink. · Take an over-the-counter pain medicine, such as acetaminophen (Tylenol), ibuprofen (Advil, Motrin), or naproxen (Aleve). Read and follow all instructions on the label. · Before you use cough and cold medicines, check the label. These medicines may not be safe for young children or for people with certain health problems. · Be careful when taking over-the-counter cold or flu medicines and Tylenol at the same time. Many of these medicines have acetaminophen, which is Tylenol. Read the labels to make sure that you are not taking more than the recommended dose. Too much acetaminophen (Tylenol) can be harmful. · Get plenty of rest. 
· Do not smoke or allow others to smoke around you. If you need help quitting, talk to your doctor about stop-smoking programs and medicines. These can increase your chances of quitting for good. When should you call for help? Call 911 anytime you think you may need emergency care. For example, call if: 
· You have severe trouble breathing. Call your doctor now or seek immediate medical care if: 
· You seem to be getting much sicker. · You have new or worse trouble breathing. · You have a new or higher fever. · You have a new rash.  
Watch closely for changes in your health, and be sure to contact your doctor if: 
· You have a new symptom, such as a sore throat, an earache, or sinus pain. · You cough more deeply or more often, especially if you notice more mucus or a change in the color of your mucus. · You do not get better as expected. Where can you learn more? Go to http://sotero-richie.info/. Enter W195 in the search box to learn more about \"Upper Respiratory Infection (Cold): Care Instructions. \" Current as of: June 30, 2016 Content Version: 11.1 © 8910-9875 Texture[employer]. Care instructions adapted under license by My[employer] (which disclaims liability or warranty for this information). If you have questions about a medical condition or this instruction, always ask your healthcare professional. [address] any warranty or liability for your use of this information. Introducing [facility] & HEALTH SERVICES! Afia Manuel introduces [employer] patient portal. Now you can access parts of your medical record, email your doctor's office, and request medication refills online. 1. In your internet browser, go to [email or web address]. [email or web address] [email or web address] 2. Click on the First Time User? Click Here link in the Sign In box. You will see the New Member Sign Up page. 3. Enter your [employer] Access Code exactly as it appears below. You will not need to use this code after youve completed the sign-up process. If you do not sign up before the expiration date, you must request a new code. · [employer] Access Code: [email or web address] Expires: 5/23/2017  2:58 PM 
 
4. Enter the last four digits of your Social Security Number (xxxx) and Date of Birth (mm/dd/yyyy) as indicated and click Submit. You will be taken to the next sign-up page. 5. Create a PoshVinet ID. This will be your [employer] login ID and cannot be changed, so think of one that is secure and easy to remember. 6. Create a PoshVinet password. You can change your password at any time. 7. Enter your Password Reset Question and Answer. This can be used at a later time if you forget your password. 8. Enter your e-mail address. You will receive e-mail notification when new information is available in [address] 9. Click Sign Up. You can now view and download portions of your medical record. 10. Click the Download Summary menu link to download a portable copy of your medical information. If you have questions, please visit the Frequently Asked Questions section of the [employer] website. Remember, [employer] is NOT to be used for urgent needs. For medical emergencies, dial 911. Now available from your iPhone and Android! Please provide this summary of care documentation to your next provider. Your primary care clinician is listed as Eliseo Young. If you have any questions after today's visit, please call [phone].

## 2017-07-25 ENCOUNTER — OFFICE VISIT (OUTPATIENT)
Dept: FAMILY MEDICINE CLINIC | Age: 82
End: 2017-07-25

## 2017-07-25 VITALS
RESPIRATION RATE: 16 BRPM | BODY MASS INDEX: 25.43 KG/M2 | DIASTOLIC BLOOD PRESSURE: 87 MMHG | HEART RATE: 79 BPM | OXYGEN SATURATION: 94 % | TEMPERATURE: 97.6 F | SYSTOLIC BLOOD PRESSURE: 140 MMHG | HEIGHT: 67 IN | WEIGHT: 162 LBS

## 2017-07-25 DIAGNOSIS — I10 ESSENTIAL HYPERTENSION WITH GOAL BLOOD PRESSURE LESS THAN 140/90: Primary | ICD-10-CM

## 2017-07-25 DIAGNOSIS — K21.9 GASTROESOPHAGEAL REFLUX DISEASE WITHOUT ESOPHAGITIS: ICD-10-CM

## 2017-07-25 DIAGNOSIS — E78.5 HYPERLIPIDEMIA, UNSPECIFIED HYPERLIPIDEMIA TYPE: ICD-10-CM

## 2017-07-25 RX ORDER — PHENOL/SODIUM PHENOLATE
20 AEROSOL, SPRAY (ML) MUCOUS MEMBRANE DAILY
Qty: 90 TAB | Refills: 1 | Status: SHIPPED | OUTPATIENT
Start: 2017-07-25 | End: 2018-01-25 | Stop reason: SDUPTHER

## 2017-07-25 NOTE — PATIENT INSTRUCTIONS

## 2017-07-25 NOTE — MR AVS SNAPSHOT
Visit Information Date & Time Provider Department Dept. Phone Encounter #  
 7/25/2017  1:15 PM Pieter Saucedo MD [address] [id number] Follow-up Instructions Return for previous appt. Your Appointments 12/12/2017  9:00 AM  
Office Visit with Pieter Saucedo MD  
[facility] ([address]) Appt Note: mwv  
 [address] [address] [place]  
  
   
 [address] 6/19/2018  3:20 PM  
ESTABLISHED PATIENT with CIARA Anthony [facility] ([address]) Appt Note: Return in about 1 year (around 6/21/2018) for BPH (AUA/PVR) on the PA schedule. [address] [facility], [place] [address]  
  
   
 [address] [facility], [address] [place] [address] Upcoming Health Maintenance Date Due DTaP/Tdap/Td series (1 - Tdap) 7/10/1953 ZOSTER VACCINE AGE 60> 5/10/1992 GLAUCOMA SCREENING Q2Y 7/10/1997 Pneumococcal 65+ Low/Medium Risk (1 of 2 - PCV13) 7/10/1997 INFLUENZA AGE 9 TO ADULT 8/1/2017 MEDICARE YEARLY EXAM 12/15/2017 Allergies as of 7/25/2017  Review Complete On: 7/25/2017 By: Pieter Saucedo MD  
 No Known Allergies Current Immunizations  Reviewed on 12/14/2016 Name Date Influenza High Dose Vaccine PF 9/22/2016 Not reviewed this visit You Were Diagnosed With   
  
 Codes Comments Gastroesophageal reflux disease without esophagitis    -  Primary ICD-10-CM: K21.9 ICD-9-CM: 530.81 Essential hypertension with goal blood pressure less than 140/90     ICD-10-CM: I10 
ICD-9-CM: 401.9 Hyperlipidemia, unspecified hyperlipidemia type     ICD-10-CM: E78.5 ICD-9-CM: 272.4 Vitals BP Pulse Temp Resp Height(growth percentile) Weight(growth percentile) 140/87 79 97.6 °F (36.4 °C) (Oral) 16 5' 7\" (1.702 m) 162 lb (73.5 kg) SpO2 BMI Smoking Status 94% 25.37 kg/m2 Former Smoker BMI and BSA Data Body Mass Index Body Surface Area  
 25.37 kg/m 2 1.86 m 2 Preferred Pharmacy [facility] Phone [id number] [address] [facility] 310 [address] [facility] [phone] Your Updated Medication List  
  
   
This list is accurate as of: 7/25/17  1:42 PM.  Always use your most recent med list. amLODIPine 10 mg tablet Commonly known as:  Remonia Grates Take 1 Tab by mouth daily. aspirin delayed-release 81 mg tablet Take  by mouth daily. ibuprofen 200 mg Cap Take 400 mg by mouth two (2) times a day. lisinopril 20 mg tablet Commonly known as:  Charlie Littler Take 1 Tab by mouth daily. MYRBETRIQ 50 mg ER tablet Generic drug:  mirabegron ER  
take 1 tablet by mouth once daily Omeprazole delayed release 20 mg tablet Commonly known as:  PRILOSEC D/R Take 1 Tab by mouth daily. tamsulosin 0.4 mg capsule Commonly known as:  FLOMAX  
take 1 capsule by mouth once daily AFTER DINNER Prescriptions Sent to Pharmacy Refills Omeprazole delayed release (PRILOSEC D/R) 20 mg tablet 1 Sig: Take 1 Tab by mouth daily. Class: Normal  
 Pharmacy: [facility] [address] [place] [address] [address] [facility] #: [phone] Route: Oral  
  
Follow-up Instructions Return for previous appt. Patient Instructions Gastroesophageal Reflux Disease (GERD): Care Instructions Your Care Instructions Gastroesophageal reflux disease (GERD) is the backward flow of stomach acid into the esophagus. The esophagus is the tube that leads from your throat to your stomach. A one-way valve prevents the stomach acid from moving up into this tube. When you have GERD, this valve does not close tightly enough.  
If you have mild GERD symptoms including heartburn, you may be able to control the problem with antacids or over-the-counter medicine. Changing your diet, losing weight, and making other lifestyle changes can also help reduce symptoms. Follow-up care is a key part of your treatment and safety. Be sure to make and go to all appointments, and call your doctor if you are having problems. Its also a good idea to know your test results and keep a list of the medicines you take. How can you care for yourself at home? · Take your medicines exactly as prescribed. Call your doctor if you think you are having a problem with your medicine. · Your doctor may recommend over-the-counter medicine. For mild or occasional indigestion, antacids, such as Tums, Gaviscon, Mylanta, or Maalox, may help. Your doctor also may recommend over-the-counter acid reducers, such as Pepcid AC, Tagamet HB, Zantac 75, or Prilosec. Read and follow all instructions on the label. If you use these medicines often, talk with your doctor. · Change your eating habits. ¨ Its best to eat several small meals instead of two or three large meals. ¨ After you eat, wait 2 to 3 hours before you lie down. ¨ Chocolate, mint, and alcohol can make GERD worse. ¨ Spicy foods, foods that have a lot of acid (like tomatoes and oranges), and coffee can make GERD symptoms worse in some people. If your symptoms are worse after you eat a certain food, you may want to stop eating that food to see if your symptoms get better. · Do not smoke or chew tobacco. Smoking can make GERD worse. If you need help quitting, talk to your doctor about stop-smoking programs and medicines. These can increase your chances of quitting for good. · If you have GERD symptoms at night, raise the head of your bed 6 to 8 inches by putting the frame on blocks or placing a foam wedge under the head of your mattress. (Adding extra pillows does not work.) · Do not wear tight clothing around your middle. · Lose weight if you need to. Losing just 5 to 10 pounds can help. When should you call for help? Call your doctor now or seek immediate medical care if: 
· You have new or different belly pain. · Your stools are black and tarlike or have streaks of blood. Watch closely for changes in your health, and be sure to contact your doctor if: 
· Your symptoms have not improved after 2 days. · Food seems to catch in your throat or chest. 
Where can you learn more? Go to http://sotero-richie.info/. Enter F324 in the search box to learn more about \"Gastroesophageal Reflux Disease (GERD): Care Instructions. \" Current as of: August 9, 2016 Content Version: 11.3 © 1398-8238 Loopcam. Care instructions adapted under license by [employer] (which disclaims liability or warranty for this information). If you have questions about a medical condition or this instruction, always ask your healthcare professional. [place] [address] any warranty or liability for your use of this information. Introducing [facility] & HEALTH SERVICES! [employer] introduces [employer] patient portal. Now you can access parts of your medical record, email your doctor's office, and request medication refills online. 1. In your internet browser, go to [email or web address]. [employer] 2. Click on the First Time User? Click Here link in the Sign In box. You will see the New Member Sign Up page. 3. Enter your [employer] Access Code exactly as it appears below. You will not need to use this code after youve completed the sign-up process. If you do not sign up before the expiration date, you must request a new code. · [employer] Access Code: [email or web address] Expires: 9/19/2017  4:35 PM 
 
4. Enter the last four digits of your Social Security Number (xxxx) and Date of Birth (mm/dd/yyyy) as indicated and click Submit. You will be taken to the next sign-up page. 5. Create a CrowdCurity ID. This will be your [employer] login ID and cannot be changed, so think of one that is secure and easy to remember. 6. Create a [employer] password. You can change your password at any time. 7. Enter your Password Reset Question and Answer. This can be used at a later time if you forget your password. 8. Enter your e-mail address. You will receive e-mail notification when new information is available in [address] 9. Click Sign Up. You can now view and download portions of your medical record. 10. Click the Download Summary menu link to download a portable copy of your medical information. If you have questions, please visit the Frequently Asked Questions section of the [employer] website. Remember, [employer] is NOT to be used for urgent needs. For medical emergencies, dial 911. Now available from your iPhone and Android! Please provide this summary of care documentation to your next provider. Your primary care clinician is listed as Princess Neal. If you have any questions after today's visit, please call [phone].

## 2017-07-25 NOTE — PROGRESS NOTES
1. Have you been to the ER, urgent care clinic since your last visit? Hospitalized since your last visit? No    2. Have you seen or consulted any other health care providers outside of the [address] [place] [address] since your last visit? Include any pap smears or colon screening.  Yes patient saw urologist in July 1st 2017      Patient here for 6 month follow-up

## 2017-07-25 NOTE — PROGRESS NOTES
Chief Complaint   Patient presents with    Follow-up     6 month       Pt is a 80y.o. year old male who presents for follow up of his chronic medical problems    Health Maintenance Due   Topic Date Due    DTaP/Tdap/Td series (1 - Tdap) 07/10/1953    ZOSTER VACCINE AGE 60>  05/10/1992    GLAUCOMA SCREENING Q2Y  07/10/1997    Pneumococcal 65+ Low/Medium Risk (1 of 2 - PCV13) 07/10/1997       Sees Urology-on 2 meds; Flomax and Myrbetriq      BP Readings from Last 3 Encounters:   07/25/17 140/87   06/21/17 118/64   02/27/17 130/79   Compliant with Norvasc and Lisinopril    Lab Results   Component Value Date/Time    Cholesterol, total 218 12/14/2016 11:28 AM    HDL Cholesterol 66 12/14/2016 11:28 AM    LDL, calculated 132 12/14/2016 11:28 AM    VLDL, calculated 20 12/14/2016 11:28 AM    Triglyceride 100 12/14/2016 11:28 AM    CHOL/HDL Ratio 3.3 12/14/2016 11:28 AM     Was taking Prilosec-needs refill; has not had it in a while in over 6 months  Does a lot of burping, heartburn when he bends over    Problems Age related he says-hurts, walks crooked; once he gets moving he is better; still plays golf regularly    ROS:    Pt denies: Wt loss, Fever/Chills, HA, Visual changes, Fatigue, Chest pain, SOB, PENA, Abd pain, N/V/D/C, Blood in stool or urine, Edema. Pertinent positive as above in HPI.  All others were negative    Patient Active Problem List   Diagnosis Code    OAB (overactive bladder) N32.81    Essential hypertension with goal blood pressure less than 140/90 I10    Gastroesophageal reflux disease K21.9    Allergic rhinitis J30.9    Benign non-nodular prostatic hyperplasia N40.0    Diastasis recti M62.08    S/p bilateral carpal tunnel release Z98.890    Advance directive discussed with patient Z71.89       Past Medical History:   Diagnosis Date    BPH (benign prostatic hypertrophy)     Carpal tunnel syndrome     Hypertension        Current Outpatient Prescriptions   Medication Sig Dispense Refill     tamsulosin (FLOMAX) 0.4 mg capsule take 1 capsule by mouth once daily AFTER DINNER 90 Cap 1    MYRBETRIQ 50 mg ER tablet take 1 tablet by mouth once daily 30 Tab 2    lisinopril (PRINIVIL, ZESTRIL) 20 mg tablet Take 1 Tab by mouth daily. 90 Tab 3    amLODIPine (NORVASC) 10 mg tablet Take 1 Tab by mouth daily. 90 Tab 3    ibuprofen 200 mg cap Take 400 mg by mouth two (2) times a day.  aspirin delayed-release 81 mg tablet Take  by mouth daily. History   Smoking Status    Former Smoker    Years: 15.00    Types: Cigarettes    Quit date: 1/1/1965   Smokeless Tobacco    Former User       No Known Allergies    Patient Labs were reviewed: yes      Patient Past Records were reviewed:  yes        Objective:     Vitals:    07/25/17 1313   BP: 140/87   Pulse: 79   Resp: 16   Temp: 97.6 °F (36.4 °C)   TempSrc: Oral   SpO2: 94%   Weight: 162 lb (73.5 kg)   Height: 5' 7\" (1.702 m)     Body mass index is 25.37 kg/(m^2). Exam:   Appearance: alert, well appearing,  oriented to person, place, and time, acyanotic, in no respiratory distress and well hydrated. HEENT:  NC/AT, pink conj, anicteric sclerae  Neck:  No cervical lymphadenopathy, no JVD, no thyromegaly, no carotid bruit  Heart:  RRR without M/R/G  Lungs:  CTAB, no rhonchi, rales, or wheezes with good air exchange   Abdomen:  Non-tender, pos bowel sounds, no hepatosplenomegaly  Ext:  No C/C/E    Skin: no rash  Neuro: no lateralizing signs, CNs II-XII intact      Assessment/ Plan:   Diagnoses and all orders for this visit:    1. Essential hypertension with goal blood pressure less than 140/90-controlled, continue with present meds    2. Hyperlipidemia, unspecified hyperlipidemia type-discussed low fat diet, recheck next visit/fasting    3. Gastroesophageal reflux disease without esophagitis-discussed lifestyle changes; will take the PPI daily for 6 weeks then prn  -     Omeprazole delayed release (PRILOSEC D/R) 20 mg tablet;  Take 1 Tab by mouth daily. Continue follow up with urology for BPH      Follow-up Disposition:  Return for previous appt. -wellness visit/follow up then        I have discussed the diagnosis with the patient and the intended plan as seen in the above orders. The patient has received an After-Visit Summary and questions were answered concerning future plans. Medication Side Effects and Warnings were discussed with patient: yes    Patient verbalized understanding of above instructions.     Belkis Stevens MD  Internal Medicine  [facility]

## 2017-11-10 RX ORDER — AMLODIPINE BESYLATE 10 MG/1
10 TABLET ORAL DAILY
Qty: 90 TAB | Refills: 3 | Status: SHIPPED | OUTPATIENT
Start: 2017-11-10 | End: 2018-11-12 | Stop reason: SDUPTHER

## 2017-12-12 ENCOUNTER — HOSPITAL ENCOUNTER (OUTPATIENT)
Dept: LAB | Age: 82
Discharge: HOME | End: 2017-12-12
Payer: MEDICARE

## 2017-12-12 ENCOUNTER — OFFICE VISIT (OUTPATIENT)
Dept: FAMILY MEDICINE CLINIC | Age: 82
End: 2017-12-12

## 2017-12-12 VITALS
BODY MASS INDEX: 24.33 KG/M2 | RESPIRATION RATE: 18 BRPM | DIASTOLIC BLOOD PRESSURE: 66 MMHG | SYSTOLIC BLOOD PRESSURE: 121 MMHG | OXYGEN SATURATION: 95 % | HEART RATE: 51 BPM | TEMPERATURE: 96.5 F | WEIGHT: 155 LBS | HEIGHT: 67 IN

## 2017-12-12 DIAGNOSIS — N40.0 BENIGN NON-NODULAR PROSTATIC HYPERPLASIA: ICD-10-CM

## 2017-12-12 DIAGNOSIS — I10 ESSENTIAL HYPERTENSION WITH GOAL BLOOD PRESSURE LESS THAN 140/90: ICD-10-CM

## 2017-12-12 DIAGNOSIS — R25.2 LEG CRAMPS: ICD-10-CM

## 2017-12-12 DIAGNOSIS — K52.9 ACUTE GASTROENTERITIS: ICD-10-CM

## 2017-12-12 DIAGNOSIS — Z00.00 MEDICARE ANNUAL WELLNESS VISIT, SUBSEQUENT: Primary | ICD-10-CM

## 2017-12-12 DIAGNOSIS — R14.2 BURPING: ICD-10-CM

## 2017-12-12 DIAGNOSIS — M72.2 PLANTAR FASCIITIS OF RIGHT FOOT: ICD-10-CM

## 2017-12-12 DIAGNOSIS — E55.9 VITAMIN D DEFICIENCY: ICD-10-CM

## 2017-12-12 DIAGNOSIS — M19.049 OSTEOARTHRITIS OF FINGER, UNSPECIFIED LATERALITY: ICD-10-CM

## 2017-12-12 DIAGNOSIS — K21.9 GASTROESOPHAGEAL REFLUX DISEASE, ESOPHAGITIS PRESENCE NOT SPECIFIED: ICD-10-CM

## 2017-12-12 DIAGNOSIS — Z71.89 ADVANCE DIRECTIVE DISCUSSED WITH PATIENT: ICD-10-CM

## 2017-12-12 LAB
25(OH)D3 SERPL-MCNC: 23.2 NG/ML (ref 30–100)
ALBUMIN SERPL-MCNC: 3.9 G/DL (ref 3.4–5)
ALBUMIN/GLOB SERPL: 1.1 {RATIO} (ref 0.8–1.7)
ALP SERPL-CCNC: 70 U/L (ref 45–117)
ALT SERPL-CCNC: 24 U/L (ref 16–61)
ANION GAP SERPL CALC-SCNC: 9 MMOL/L (ref 3–18)
AST SERPL-CCNC: 21 U/L (ref 15–37)
BASOPHILS # BLD: 0 K/UL (ref 0–0.06)
BASOPHILS NFR BLD: 1 % (ref 0–2)
BILIRUB SERPL-MCNC: 0.9 MG/DL (ref 0.2–1)
BUN SERPL-MCNC: 18 MG/DL (ref 7–18)
BUN/CREAT SERPL: 16 (ref 12–20)
CALCIUM SERPL-MCNC: 9.1 MG/DL (ref 8.5–10.1)
CHLORIDE SERPL-SCNC: 101 MMOL/L (ref 100–108)
CHOLEST SERPL-MCNC: 169 MG/DL
CO2 SERPL-SCNC: 28 MMOL/L (ref 21–32)
CREAT SERPL-MCNC: 1.1 MG/DL (ref 0.6–1.3)
DIFFERENTIAL METHOD BLD: ABNORMAL
EOSINOPHIL # BLD: 0.1 K/UL (ref 0–0.4)
EOSINOPHIL NFR BLD: 2 % (ref 0–5)
ERYTHROCYTE [DISTWIDTH] IN BLOOD BY AUTOMATED COUNT: 13.3 % (ref 11.6–14.5)
GLOBULIN SER CALC-MCNC: 3.4 G/DL (ref 2–4)
GLUCOSE SERPL-MCNC: 91 MG/DL (ref 74–99)
HCT VFR BLD AUTO: 44.2 % (ref 36–48)
HDLC SERPL-MCNC: 58 MG/DL (ref 40–60)
HDLC SERPL: 2.9 {RATIO} (ref 0–5)
HGB BLD-MCNC: 15 G/DL (ref 13–16)
LDLC SERPL CALC-MCNC: 94.6 MG/DL (ref 0–100)
LIPID PROFILE,FLP: NORMAL
LYMPHOCYTES # BLD: 1 K/UL (ref 0.9–3.6)
LYMPHOCYTES NFR BLD: 20 % (ref 21–52)
MAGNESIUM SERPL-MCNC: 2.2 MG/DL (ref 1.6–2.6)
MCH RBC QN AUTO: 31.9 PG (ref 24–34)
MCHC RBC AUTO-ENTMCNC: 33.9 G/DL (ref 31–37)
MCV RBC AUTO: 94 FL (ref 74–97)
MONOCYTES # BLD: 0.6 K/UL (ref 0.05–1.2)
MONOCYTES NFR BLD: 11 % (ref 3–10)
NEUTS SEG # BLD: 3.5 K/UL (ref 1.8–8)
NEUTS SEG NFR BLD: 66 % (ref 40–73)
PLATELET # BLD AUTO: 211 K/UL (ref 135–420)
PMV BLD AUTO: 10.8 FL (ref 9.2–11.8)
POTASSIUM SERPL-SCNC: 4 MMOL/L (ref 3.5–5.5)
PROT SERPL-MCNC: 7.3 G/DL (ref 6.4–8.2)
PSA SERPL-MCNC: 10.3 NG/ML (ref 0–4)
RBC # BLD AUTO: 4.7 M/UL (ref 4.7–5.5)
SODIUM SERPL-SCNC: 138 MMOL/L (ref 136–145)
TRIGL SERPL-MCNC: 82 MG/DL (ref ?–150)
VLDLC SERPL CALC-MCNC: 16.4 MG/DL
WBC # BLD AUTO: 5.2 K/UL (ref 4.6–13.2)

## 2017-12-12 PROCEDURE — 83735 ASSAY OF MAGNESIUM: CPT | Performed by: INTERNAL MEDICINE

## 2017-12-12 PROCEDURE — 80061 LIPID PANEL: CPT | Performed by: INTERNAL MEDICINE

## 2017-12-12 PROCEDURE — 80053 COMPREHEN METABOLIC PANEL: CPT | Performed by: INTERNAL MEDICINE

## 2017-12-12 PROCEDURE — 36415 COLL VENOUS BLD VENIPUNCTURE: CPT | Performed by: INTERNAL MEDICINE

## 2017-12-12 PROCEDURE — 85025 COMPLETE CBC W/AUTO DIFF WBC: CPT | Performed by: INTERNAL MEDICINE

## 2017-12-12 PROCEDURE — 84153 ASSAY OF PSA TOTAL: CPT | Performed by: INTERNAL MEDICINE

## 2017-12-12 PROCEDURE — 82306 VITAMIN D 25 HYDROXY: CPT | Performed by: INTERNAL MEDICINE

## 2017-12-12 RX ORDER — DIPHENHYDRAMINE HCL 50 MG
50 CAPSULE ORAL
COMMUNITY
End: 2018-08-29

## 2017-12-12 RX ORDER — DIPHENOXYLATE HYDROCHLORIDE AND ATROPINE SULFATE 2.5; .025 MG/1; MG/1
1 TABLET ORAL
Qty: 30 TAB | Refills: 1 | Status: SHIPPED | OUTPATIENT
Start: 2017-12-12 | End: 2018-07-02

## 2017-12-12 NOTE — ACP (ADVANCE CARE PLANNING)
Advance Care Planning (ACP) Provider Conversation Snapshot    Date of ACP Conversation: 12/12/17  Persons included in Conversation:  patient  Length of ACP Conversation in minutes:  <16 minutes (Non-Billable)    Authorized Decision Maker (if patient is incapable of making informed decisions):    This person is:   his daughter who is a nurse, Kole Peoples          For Patients with Decision Making Capacity:   Values/Goals: Exploration of values, goals, and preferences if recovery is not expected, even with continued medical treatment in the event of:  Imminent death  Severe, permanent brain injury    Conversation Outcomes / Follow-Up Plan:   Recommended completion of Advance Directive form after review of ACP materials and conversation with prospective healthcare agent

## 2017-12-12 NOTE — MR AVS SNAPSHOT
Visit Information Date & Time Provider Department Dept. Phone Encounter #  
 12/12/2017  9:00 AM Akin Iniguez MD [facility] [id number] Follow-up Instructions Return in about 6 months (around 6/12/2018) for follow up. Your Appointments 6/19/2018  3:20 PM  
ESTABLISHED PATIENT with CIARA Gutierrez [facility] ([facility]) Appt Note: Return in about 1 year (around 6/21/2018) for BPH (AUA/PVR) on the PA schedule. [address], [place] [address] [address] [facility] [address]  
  
   
 [address] [place] [address], [address] [facility] [address] Upcoming Health Maintenance Date Due DTaP/Tdap/Td series (1 - Tdap) 7/10/1953 ZOSTER VACCINE AGE 60> 5/10/1992 GLAUCOMA SCREENING Q2Y 7/10/1997 Pneumococcal 65+ Low/Medium Risk (1 of 2 - PCV13) 7/10/1997 Influenza Age 5 to Adult 8/1/2017 MEDICARE YEARLY EXAM 12/15/2017 Allergies as of 12/12/2017  Review Complete On: 12/12/2017 By: Debra Manjarrez LPN No Known Allergies Current Immunizations  Reviewed on 12/12/2017 Name Date Influenza High Dose Vaccine PF 10/6/2017, 9/22/2016 Influenza Vaccine (Quadrivalent) 10/6/2017 Reviewed by Akin Iniguez MD on 12/12/2017 at  9:46 AM  
 Reviewed by Akin Iniguez MD on 12/12/2017 at  9:56 AM  
You Were Diagnosed With   
  
 Codes Comments Medicare annual wellness visit, subsequent    -  Primary ICD-10-CM: Z00.00 ICD-9-CM: V70.0 Essential hypertension with goal blood pressure less than 140/90     ICD-10-CM: I10 
ICD-9-CM: 401.9 Gastroesophageal reflux disease, esophagitis presence not specified     ICD-10-CM: K21.9 ICD-9-CM: 530.81 Benign non-nodular prostatic hyperplasia     ICD-10-CM: N40.0 ICD-9-CM: 600.90 Advance directive discussed with patient     ICD-10-CM: Z71.89 ICD-9-CM: V65.49 Acute gastroenteritis     ICD-10-CM: K52.9 ICD-9-CM: 558.9 Leg cramps     ICD-10-CM: R25.2 ICD-9-CM: 729.82 Burping     ICD-10-CM: R14.2 ICD-9-CM: 787.3 Plantar fasciitis of right foot     ICD-10-CM: M72.2 ICD-9-CM: 728.71 Osteoarthritis of finger, unspecified laterality     ICD-10-CM: M19.049 ICD-9-CM: 715.94 Vitamin D deficiency     ICD-10-CM: E55.9 ICD-9-CM: 268.9 Vitals BP Pulse Temp Resp Height(growth percentile) Weight(growth percentile) 121/66 (!) 51 96.5 °F (35.8 °C) (Oral) 18 5' 7\" (1.702 m) 155 lb (70.3 kg) SpO2 BMI Smoking Status 95% 24.28 kg/m2 Former Smoker BMI and BSA Data Body Mass Index Body Surface Area  
 24.28 kg/m 2 1.82 m 2 Preferred Pharmacy Pharmacy Name Phone [id number] [address], [address] [facility] [phone] Your Updated Medication List  
  
   
This list is accurate as of: 12/12/17 10:21 AM.  Always use your most recent med list. amLODIPine 10 mg tablet Commonly known as:  Rekha Rhona Take 1 Tab by mouth daily. aspirin delayed-release 81 mg tablet Take  by mouth daily. diphenhydrAMINE 50 mg capsule Commonly known as:  BENADRYL Take 50 mg by mouth nightly as needed. diphenoxylate-atropine 2.5-0.025 mg per tablet Commonly known as:  LOMOTIL Take 1 Tab by mouth two (2) times daily as needed for Diarrhea. Max Daily Amount: 2 Tabs. For diarrhea  
  
 ibuprofen 200 mg Cap Take 400 mg by mouth two (2) times a day. lisinopril 20 mg tablet Commonly known as:  Arnold Files Take 1 Tab by mouth daily. MYRBETRIQ 50 mg ER tablet Generic drug:  mirabegron ER  
take 1 tablet once daily Omeprazole delayed release 20 mg tablet Commonly known as:  PRILOSEC D/R Take 1 Tab by mouth daily. tamsulosin 0.4 mg capsule Commonly known as:  FLOMAX  
take 1 capsule by mouth once daily AFTER DINNER Prescriptions Printed Refills diphenoxylate-atropine (LOMOTIL) 2.5-0.025 mg per tablet 1 Sig: Take 1 Tab by mouth two (2) times daily as needed for Diarrhea. Max Daily Amount: 2 Tabs. For diarrhea Class: Print Route: Oral  
  
Follow-up Instructions Return in about 6 months (around 6/12/2018) for follow up. To-Do List   
 12/12/2017 Lab:  CBC WITH AUTOMATED DIFF   
  
 12/12/2017 Lab:  LIPID PANEL   
  
 12/12/2017 Lab:  MAGNESIUM   
  
 12/12/2017 Lab:  METABOLIC PANEL, COMPREHENSIVE   
  
 12/12/2017 Lab:  PSA, DIAGNOSTIC (PROSTATE SPECIFIC AG)   
  
 12/12/2017 Lab:  VITAMIN D, 25 HYDROXY Patient Instructions Medicare Wellness Visit, Male The best way to live healthy is to have a healthy lifestyle by eating a well-balanced diet, exercising regularly, limiting alcohol and stopping smoking. Regular physical exams and screening tests are another way to keep healthy. Preventive exams provided by your health care provider can find health problems before they become diseases or illnesses. Preventive services including immunizations, screening tests, monitoring and exams can help you take care of your own health. All people over age 72 should have a pneumovax  and and a prevnar shot to prevent pneumonia. These are once in a lifetime unless you and your provider decide differently. All people over 65 should have a yearly flu shot and a tetanus vaccine every 10 years. Screening for diabetes mellitus with a blood sugar test should be done every year. Glaucoma is a disease of the eye due to increased ocular pressure that can lead to blindness and it should be done every year by an eye professional. 
 
Cardiovascular screening tests that check for elevated lipids (fatty part of blood) which can lead to heart disease and strokes should be done every 5 years.  
 
Colorectal screening that evaluates for blood or polyps in your colon should be done yearly as a stool test or every five years as a flexible sigmoidoscope or every 10 years as a colonoscopy up to age 76. Men up to age 76 may need a screening blood test for prostate cancer at certain intervals, depending on their personal and family history. This decision is between the patient and his provider. If you have been a smoker or had family history of abdominal aortic aneurysms, you and your provider may decide to schedule an ultrasound test of your aorta. Hepatitis C screening is also recommended for anyone born between [birth year] through [address]. A shingles vaccine is also recommended once in a lifetime after age 61. Your Medicare Wellness Exam is recommended annually. Here is a list of your current Health Maintenance items with a due date: 
Health Maintenance Due Topic Date Due  
 DTaP/Tdap/Td  (1 - Tdap) 07/10/1953  Shingles Vaccine  05/10/1992  Glaucoma Screening   07/10/1997  Pneumococcal Vaccine (1 of 2 - PCV13) 07/10/1997  Flu Vaccine  08/01/2017 Introducing [facility] SERVICES! Melinda Robbins introduces [employer] patient portal. Now you can access parts of your medical record, email your doctor's office, and request medication refills online. 1. In your internet browser, go to [email or web address] 2. Click on the First Time User? Click Here link in the Sign In box. You will see the New Member Sign Up page. 3. Enter your [employer] Access Code exactly as it appears below. You will not need to use this code after youve completed the sign-up process. If you do not sign up before the expiration date, you must request a new code. · [employer] Access Code: [email or web address] Expires: 3/12/2018 10:21 AM 
 
4. Enter the last four digits of your Social Security Number (xxxx) and Date of Birth (mm/dd/yyyy) as indicated and click Submit. You will be taken to the next sign-up page. 5. Create a [employer] ID. This will be your [employer] login ID and cannot be changed, so think of one that is secure and easy to remember. 6. Create a [employer] password. You can change your password at any time. 7. Enter your Password Reset Question and Answer. This can be used at a later time if you forget your password. 8. Enter your e-mail address. You will receive e-mail notification when new information is available in [address] 9. Click Sign Up. You can now view and download portions of your medical record. 10. Click the Download Summary menu link to download a portable copy of your medical information. If you have questions, please visit the Frequently Asked Questions section of the [employer] website. Remember, [employer] is NOT to be used for urgent needs. For medical emergencies, dial 911. Now available from your iPhone and Android! Please provide this summary of care documentation to your next provider. Your primary care clinician is listed as Donna Tovar. If you have any questions after today's visit, please call [phone].

## 2017-12-12 NOTE — PROGRESS NOTES
Chief Complaint   Patient presents with    Annual Wellness Visit    Follow Up Chronic Condition       Pt is a 80y.o. year old male who presents for follow up of his chronic medical problems    BP Readings from Last 3 Encounters:   17 121/66   17 140/87   17 118/64   compliant with Norvasc and Lisinopril    Wt Readings from Last 3 Encounters:   17 155 lb (70.3 kg)   17 162 lb (73.5 kg)   17 161 lb (73 kg)   BMI 24    Lab Results   Component Value Date/Time    Cholesterol, total 218 2016 11:28 AM    HDL Cholesterol 66 2016 11:28 AM    LDL, calculated 132 2016 11:28 AM    VLDL, calculated 20 2016 11:28 AM    Triglyceride 100 2016 11:28 AM    CHOL/HDL Ratio 3.3 2016 11:28 AM   Fasting? Cup of coffee  Not on cholesterol med    GERD-taking PPI daily    Last week, had diarrhea at 10 PM that got worse up to next day  Went to [facility] 2 days later-told to continue Loperamide as it would usually last 3 days, ate bland  Did better for a few hours but restarted again  Yesterday was good  Occasional cramping, no blood in stools  Labs reviewed from [facility] normal, WBC normal though neutrophil count is high  Asking for Lomotil which he took before  Feels too weak to sing and  Voodoo so had to cancel that over the weekend    Lab Results   Component Value Date/Time    Prostate Specific Ag 10.3 2017 10:23 AM    Prostate Specific Ag 10.57 2016 03:27 PM   Sees Urology-sxs on and off; compliant with meds  Office Visit     2017  [facility]   Urology    OAB (overactive bladder)   Dx    Overactive Bladder, Benign Prostatic Hypertrophy   Reason for visit    Progress Notes        Reino Popper   7/10/1932   80 y.o.   2017      UROLOGY ESTABLISHED PATIENT         ICD-10-CM ICD-9-CM   1. OAB (overactive bladder) N32.81 596.51      Assessment:    1.  Pt is a 80 y.o. male with OAB  2. BPH with LUTS  3. HTN  4. Urge urinary incontinence      Plan:  PVR today 15 cc's. Continue Myrbetriq 50 mg and Flomax 0.4 mg.   RTC in one year for AUA/PVR on the PA schedule. Plantar fasciitis on the right foot -got inserts which helps    Still with leg cramps-mostly in the winter    A lot of burping-worse recently but not overly concerned about it  Has not seen GI in a while  told he has some bacteria that is not worth treating    Back pain when doing dishes, extending hands out; non radiating  Arthritis on hands and fingers  Hx of sciatica in the past-got better with OTC Ibuprofen with Dr. Guillermo Stephens and got better  Used to play a lot of tennis, now just golf    ROS:    Pt denies: Wt loss, Fever/Chills, HA, Visual changes, Fatigue, Chest pain, SOB, PENA, Abd pain, N/V/D/C, Blood in stool or urine, Edema. Pertinent positive as above in HPI. All others were negative    Patient Active Problem List   Diagnosis Code    OAB (overactive bladder) N32.81    Essential hypertension with goal blood pressure less than 140/90 I10    Gastroesophageal reflux disease K21.9    Allergic rhinitis J30.9    Benign non-nodular prostatic hyperplasia N40.0    Diastasis recti M62.08    S/p bilateral carpal tunnel release Z98.890    Advance directive discussed with patient Z71.89    Osteoarthritis of finger M19.049       Past Medical History:   Diagnosis Date    BPH (benign prostatic hypertrophy)     Carpal tunnel syndrome     Hypertension        Current Outpatient Prescriptions   Medication Sig Dispense Refill    diphenhydrAMINE (BENADRYL) 50 mg capsule Take 50 mg by mouth nightly as needed.  diphenoxylate-atropine (LOMOTIL) 2.5-0.025 mg per tablet Take 1 Tab by mouth two (2) times daily as needed for Diarrhea. Max Daily Amount: 2 Tabs. For diarrhea 30 Tab 1    amLODIPine (NORVASC) 10 mg tablet Take 1 Tab by mouth daily.  90 Tab 3    MYRBETRIQ 50 mg ER tablet take 1 tablet once daily 30 Tab 2    Omeprazole delayed release (PRILOSEC D/R) 20 mg tablet Take 1 Tab by mouth daily. 90 Tab 1    lisinopril (PRINIVIL, ZESTRIL) 20 mg tablet Take 1 Tab by mouth daily. 90 Tab 3    ibuprofen 200 mg cap Take 400 mg by mouth two (2) times a day.  aspirin delayed-release 81 mg tablet Take  by mouth daily.  tamsulosin (FLOMAX) 0.4 mg capsule take 1 capsule by mouth once daily AFTER DINNER 90 Cap 3       History   Smoking Status    Former Smoker    Years: 15.00    Types: Cigarettes    Quit date: 1/1/1965   Smokeless Tobacco    Former User       No Known Allergies    Patient Labs were reviewed: yes      Patient Past Records were reviewed:  yes        Objective:     Vitals:    12/12/17 0916   BP: 121/66   Pulse: (!) 51   Resp: 18   Temp: 96.5 °F (35.8 °C)   TempSrc: Oral   SpO2: 95%   Weight: 155 lb (70.3 kg)   Height: 5' 7\" (1.702 m)     Body mass index is 24.28 kg/(m^2). Exam:   Appearance: alert, well appearing,  oriented to person, place, and time, acyanotic, in no respiratory distress and well hydrated. HEENT:  NC/AT, pink conj, anicteric sclerae  Neck:  No cervical lymphadenopathy, no JVD, no thyromegaly, no carotid bruit  Heart:  RRR without M/R/G  Lungs:  CTAB, no rhonchi, rales, or wheezes with good air exchange   Abdomen:  Non-tender, pos bowel sounds, no hepatosplenomegaly  Ext:  No C/C/E    Skin: no rash  Neuro: no lateralizing signs, CNs II-XII intact      Assessment/ Plan:   Diagnoses and all orders for this visit:    1. Medicare annual wellness visit, subsequent-see note below    2. Essential hypertension with goal blood pressure less than 140/90-controlled, continue with Norvasc and Lisinopril  -     LIPID PANEL; Future  -     METABOLIC PANEL, COMPREHENSIVE; Future  -     CBC WITH AUTOMATED DIFF; Future    3. Gastroesophageal reflux disease, esophagitis presence not specified-on daily PPI, advised on lifestyle changes so he could wean himself off of PPI    4.  Benign non-nodular prostatic hyperplasia-urology following; continue with Flomax and Myrbetriq  -     PSA - PROSTATE SPECIFIC AG; Future    5. Acute gastroenteritis-resolving  -     CBC WITH AUTOMATED DIFF; Future  -     diphenoxylate-atropine (LOMOTIL) 2.5-0.025 mg per tablet; Take 1 Tab by mouth two (2) times daily as needed for Diarrhea. Max Daily Amount: 2 Tabs. For diarrhea    6. Leg cramps  -     MAGNESIUM; Future  -     VITAMIN D, 25 HYDROXY; Future    7. Burping-advised to decrease intake of gas producing foods    8. Plantar fasciitis of right foot-given exercises to do, continue with heeel splints    9. Osteoarthritis of finger, unspecified laterality-reassured, if this worsens, will send to hand surgeon    10. Vitamin D deficiency  -     VITAMIN D, 25 HYDROXY; Future    Follow-up Disposition:  Return in about 6 months (around 6/12/2018) for follow up. I have discussed the diagnosis with the patient and the intended plan as seen in the above orders. The patient has received an After-Visit Summary and questions were answered concerning future plans. Medication Side Effects and Warnings were discussed with patient: yes    Patient verbalized understanding of above instructions. Scott Pop MD  Internal Medicine  [facility]      This is a Subsequent Medicare Annual Wellness Exam (AWV) (Performed 12 months after IPPE or effective date of Medicare Part B enrollment)    I have reviewed the patient's medical history in detail and updated the computerized patient record. History     Past Medical History:   Diagnosis Date    BPH (benign prostatic hypertrophy)     Carpal tunnel syndrome     Hypertension       Past Surgical History:   Procedure Laterality Date    HX APPENDECTOMY  1942    HX CARPAL TUNNEL RELEASE       Current Outpatient Prescriptions   Medication Sig Dispense Refill    diphenhydrAMINE (BENADRYL) 50 mg capsule Take 50 mg by mouth nightly as needed.       diphenoxylate-atropine (LOMOTIL) 2.5-0.025 mg per tablet Take 1 Tab by mouth two (2) times daily as needed for Diarrhea. Max Daily Amount: 2 Tabs. For diarrhea 30 Tab 1    amLODIPine (NORVASC) 10 mg tablet Take 1 Tab by mouth daily. 90 Tab 3    MYRBETRIQ 50 mg ER tablet take 1 tablet once daily 30 Tab 2    Omeprazole delayed release (PRILOSEC D/R) 20 mg tablet Take 1 Tab by mouth daily. 90 Tab 1    lisinopril (PRINIVIL, ZESTRIL) 20 mg tablet Take 1 Tab by mouth daily. 90 Tab 3    ibuprofen 200 mg cap Take 400 mg by mouth two (2) times a day.  aspirin delayed-release 81 mg tablet Take  by mouth daily.  tamsulosin (FLOMAX) 0.4 mg capsule take 1 capsule by mouth once daily AFTER DINNER 90 Cap 3     No Known Allergies  Family History   Problem Relation Age of Onset    Family history unknown: Yes     Social History   Substance Use Topics    Smoking status: Former Smoker     Years: 15.00     Types: Cigarettes     Quit date: 1/1/1965    Smokeless tobacco: Former User    Alcohol use 0.0 - 3.0 oz/week     0 - 6 Glasses of wine per week     Patient Active Problem List   Diagnosis Code    OAB (overactive bladder) N32.81    Essential hypertension with goal blood pressure less than 140/90 I10    Gastroesophageal reflux disease K21.9    Allergic rhinitis J30.9    Benign non-nodular prostatic hyperplasia N40.0    Diastasis recti M62.08    S/p bilateral carpal tunnel release Z98.890    Advance directive discussed with patient Z71.89    Osteoarthritis of finger M19.049       Depression Risk Factor Screening:     PHQ over the last two weeks 12/12/2017   Little interest or pleasure in doing things Not at all   Feeling down, depressed or hopeless Not at all   Total Score PHQ 2 0     Alcohol Risk Factor Screening:    On any occasion in the past three months you have had more than 7 drinks containing alcohol  Red wine at dinner-a glass or 2    Functional Ability and Level of Safety:   Hearing Loss  The patient wears hearing aids.    Activities of Daily Living  The home contains: handrails and grab bars  Patient does total self care    Fall Risk  Fall Risk Assessment, last 12 mths 12/12/2017   Able to walk? Yes   Fall in past 12 months? No       Abuse Screen  Patient is not abused    Cognitive Screening   Evaluation of Cognitive Function:  Has your family/caregiver stated any concerns about your memory: no  Normal    Patient Care Team   Patient Care Team:  Andrei Randle MD as PCP - General (Internal Medicine)  Jonny Dewitt MD (Urology)  Oscar Leija MD (Ophthalmology)  Naresh Liu MD (Neurology)    Assessment/Plan   Education and counseling provided:  Are appropriate based on today's review and evaluation  End-of-Life planning (with patient's consent)-see ACP note  Pneumococcal Vaccine-[place]  Influenza Vaccine-done  Prostate cancer screening tests (PSA, covered annually)-today  Cardiovascular screening blood test-fasting lipids today  Screening for glaucoma-did it 3-4 weeks ago  Diabetes screening test-FBs today    Diagnoses and all orders for this visit:    1. Medicare annual wellness visit, subsequent-Refer to above for plan and to patient instructions for recommendations on HM    2.  Advance directive discussed with patient-see ACP note        Health Maintenance Due   Topic Date Due    DTaP/Tdap/Td series (1 - Tdap) 07/10/1953    ZOSTER VACCINE AGE 60>  05/10/1992    GLAUCOMA SCREENING Q2Y  07/10/1997    Pneumococcal 65+ Low/Medium Risk (1 of 2 - PCV13) 07/10/1997     RTC yearly for wellness visit    Advised to get immunization records from [facility]

## 2017-12-12 NOTE — PROGRESS NOTES
Chief Complaint   Patient presents with   Jaja iJmenez Annual Wellness Visit     1. Have you been to the ER, urgent care clinic since your last visit? Hospitalized since your last visit? Yes Where: [address]    2. Have you seen or consulted any other health care providers outside of the [address] [place] [address] since your last visit? Include any pap smears or colon screening. Yes Reason for visit: [address] for diarrhea. Had eye visit at [address] [place] [address]. 11/15/2017  Seen dermatology 11/1/2017.

## 2017-12-12 NOTE — PATIENT INSTRUCTIONS

## 2017-12-13 NOTE — PROGRESS NOTES
pls let patient know magnesium level is normal, PSA is stable, Vit d is slightly low so OTC Vit D 400IU once a day  Cholesterol looks great, other labs normal

## 2017-12-15 ENCOUNTER — TELEPHONE (OUTPATIENT)
Dept: FAMILY MEDICINE CLINIC | Age: 82
End: 2017-12-15

## 2018-01-16 RX ORDER — LISINOPRIL 20 MG/1
20 TABLET ORAL DAILY
Qty: 90 TAB | Refills: 3 | Status: SHIPPED | OUTPATIENT
Start: 2018-01-16 | End: 2019-01-16 | Stop reason: SDUPTHER

## 2018-01-25 DIAGNOSIS — K21.9 GASTROESOPHAGEAL REFLUX DISEASE WITHOUT ESOPHAGITIS: ICD-10-CM

## 2018-01-29 RX ORDER — PHENOL/SODIUM PHENOLATE
20 AEROSOL, SPRAY (ML) MUCOUS MEMBRANE DAILY
Qty: 90 TAB | Refills: 1 | Status: SHIPPED | OUTPATIENT
Start: 2018-01-29

## 2018-06-12 ENCOUNTER — OFFICE VISIT (OUTPATIENT)
Dept: FAMILY MEDICINE CLINIC | Age: 83
End: 2018-06-12

## 2018-06-12 ENCOUNTER — HOSPITAL ENCOUNTER (OUTPATIENT)
Dept: LAB | Age: 83
Discharge: HOME | End: 2018-06-12
Payer: MEDICARE

## 2018-06-12 VITALS
RESPIRATION RATE: 17 BRPM | BODY MASS INDEX: 24.64 KG/M2 | DIASTOLIC BLOOD PRESSURE: 68 MMHG | TEMPERATURE: 97.5 F | HEIGHT: 67 IN | SYSTOLIC BLOOD PRESSURE: 133 MMHG | HEART RATE: 60 BPM | OXYGEN SATURATION: 96 % | WEIGHT: 157 LBS

## 2018-06-12 DIAGNOSIS — R23.3 EASY BRUISING: ICD-10-CM

## 2018-06-12 DIAGNOSIS — E55.9 VITAMIN D DEFICIENCY: ICD-10-CM

## 2018-06-12 DIAGNOSIS — B07.0 PLANTAR WART, LEFT FOOT: ICD-10-CM

## 2018-06-12 DIAGNOSIS — I10 ESSENTIAL HYPERTENSION WITH GOAL BLOOD PRESSURE LESS THAN 140/90: ICD-10-CM

## 2018-06-12 DIAGNOSIS — N32.81 OAB (OVERACTIVE BLADDER): ICD-10-CM

## 2018-06-12 DIAGNOSIS — I10 ESSENTIAL HYPERTENSION WITH GOAL BLOOD PRESSURE LESS THAN 140/90: Primary | ICD-10-CM

## 2018-06-12 DIAGNOSIS — J30.2 SEASONAL ALLERGIC RHINITIS, UNSPECIFIED TRIGGER: ICD-10-CM

## 2018-06-12 DIAGNOSIS — K21.9 GASTROESOPHAGEAL REFLUX DISEASE, ESOPHAGITIS PRESENCE NOT SPECIFIED: ICD-10-CM

## 2018-06-12 LAB
ALBUMIN SERPL-MCNC: 4 G/DL (ref 3.4–5)
ALBUMIN/GLOB SERPL: 1.3 {RATIO} (ref 0.8–1.7)
ALP SERPL-CCNC: 71 U/L (ref 45–117)
ALT SERPL-CCNC: 24 U/L (ref 16–61)
ANION GAP SERPL CALC-SCNC: 10 MMOL/L (ref 3–18)
AST SERPL-CCNC: 18 U/L (ref 15–37)
BASOPHILS # BLD: 0 K/UL (ref 0–0.06)
BASOPHILS NFR BLD: 0 % (ref 0–2)
BILIRUB SERPL-MCNC: 0.4 MG/DL (ref 0.2–1)
BUN SERPL-MCNC: 28 MG/DL (ref 7–18)
BUN/CREAT SERPL: 26 (ref 12–20)
CALCIUM SERPL-MCNC: 8.7 MG/DL (ref 8.5–10.1)
CHLORIDE SERPL-SCNC: 105 MMOL/L (ref 100–108)
CHOLEST SERPL-MCNC: 190 MG/DL
CO2 SERPL-SCNC: 26 MMOL/L (ref 21–32)
CREAT SERPL-MCNC: 1.09 MG/DL (ref 0.6–1.3)
DIFFERENTIAL METHOD BLD: ABNORMAL
EOSINOPHIL # BLD: 0.2 K/UL (ref 0–0.4)
EOSINOPHIL NFR BLD: 3 % (ref 0–5)
ERYTHROCYTE [DISTWIDTH] IN BLOOD BY AUTOMATED COUNT: 13.8 % (ref 11.6–14.5)
GLOBULIN SER CALC-MCNC: 3.2 G/DL (ref 2–4)
GLUCOSE SERPL-MCNC: 122 MG/DL (ref 74–99)
HCT VFR BLD AUTO: 41.4 % (ref 36–48)
HDLC SERPL-MCNC: 58 MG/DL (ref 40–60)
HDLC SERPL: 3.3 {RATIO} (ref 0–5)
HGB BLD-MCNC: 13.9 G/DL (ref 13–16)
LDLC SERPL CALC-MCNC: 116.6 MG/DL (ref 0–100)
LIPID PROFILE,FLP: ABNORMAL
LYMPHOCYTES # BLD: 1 K/UL (ref 0.9–3.6)
LYMPHOCYTES NFR BLD: 15 % (ref 21–52)
MCH RBC QN AUTO: 32.3 PG (ref 24–34)
MCHC RBC AUTO-ENTMCNC: 33.6 G/DL (ref 31–37)
MCV RBC AUTO: 96.3 FL (ref 74–97)
MONOCYTES # BLD: 0.5 K/UL (ref 0.05–1.2)
MONOCYTES NFR BLD: 8 % (ref 3–10)
NEUTS SEG # BLD: 4.9 K/UL (ref 1.8–8)
NEUTS SEG NFR BLD: 74 % (ref 40–73)
PLATELET # BLD AUTO: 195 K/UL (ref 135–420)
PMV BLD AUTO: 10.4 FL (ref 9.2–11.8)
POTASSIUM SERPL-SCNC: 3.8 MMOL/L (ref 3.5–5.5)
PROT SERPL-MCNC: 7.2 G/DL (ref 6.4–8.2)
RBC # BLD AUTO: 4.3 M/UL (ref 4.7–5.5)
SODIUM SERPL-SCNC: 141 MMOL/L (ref 136–145)
TRIGL SERPL-MCNC: 77 MG/DL (ref ?–150)
VLDLC SERPL CALC-MCNC: 15.4 MG/DL
WBC # BLD AUTO: 6.6 K/UL (ref 4.6–13.2)

## 2018-06-12 PROCEDURE — 82306 VITAMIN D 25 HYDROXY: CPT | Performed by: INTERNAL MEDICINE

## 2018-06-12 PROCEDURE — 80061 LIPID PANEL: CPT | Performed by: INTERNAL MEDICINE

## 2018-06-12 PROCEDURE — 85025 COMPLETE CBC W/AUTO DIFF WBC: CPT | Performed by: INTERNAL MEDICINE

## 2018-06-12 PROCEDURE — 36415 COLL VENOUS BLD VENIPUNCTURE: CPT | Performed by: INTERNAL MEDICINE

## 2018-06-12 PROCEDURE — 80053 COMPREHEN METABOLIC PANEL: CPT | Performed by: INTERNAL MEDICINE

## 2018-06-12 NOTE — MR AVS SNAPSHOT
Izabel Bejarano [place] [address] 68 [facility] [place]. [address] [address] 
[phone] Patient: Altagracia Cash MRN: [id number] CIE:[id number] Visit Information Date & Time Provider Department Dept. Phone Encounter #  
 6/12/2018  8:45 AM Shalom Rodriguez MD [facility] 13 [id number] Follow-up Instructions Return in about 6 months (around 12/12/2018) for follow up, annual wellness. Your Appointments 6/19/2018  3:20 PM  
ESTABLISHED PATIENT with CIARA Peraza [facility] ([address]) Appt Note: Return in about 1 year (around 6/21/2018) for BPH (AUA/PVR) on the PA schedule. [address] [facility], [facility] 300 2201 [facility] [address]  
  
   
 301 [facility] 22 [phone]  
  
    
 12/13/2018  9:00 AM  
Office Visit with Shalom Rodriguez MD  
[place] [address] ([address]) Appt Note: [address] [place] [address] 68 [facility] [address]. [address] 500 [facility]  
  
   
 7031  [address] Upcoming Health Maintenance Date Due DTaP/Tdap/Td series (1 - Tdap) 7/10/1953 ZOSTER VACCINE AGE 60> 5/10/1992 GLAUCOMA SCREENING Q2Y 7/10/1997 Pneumococcal 65+ Low/Medium Risk (1 of 2 - PCV13) 7/10/1997 Influenza Age 5 to Adult 8/1/2018 MEDICARE YEARLY EXAM 12/13/2018 Allergies as of 6/12/2018  Review Complete On: 12/18/2017 By: Shalom Rodriguez MD  
 No Known Allergies Current Immunizations  Reviewed on 6/12/2018 Name Date Influenza High Dose Vaccine PF 10/6/2017, 9/22/2016 Influenza Vaccine (Quadrivalent) 10/6/2017 Reviewed by Shalom Rodriguez MD on 6/12/2018 at  9:04 AM  
 Reviewed by Shalom Rodriguez MD on 6/12/2018 at  9:04 AM  
You Were Diagnosed With   
  
 Codes Comments  Essential hypertension with goal blood pressure less than 140/90    -  Primary ICD-10-CM: I10 
 ICD-9-CM: 401.9 Seasonal allergic rhinitis, unspecified trigger     ICD-10-CM: J30.2 ICD-9-CM: 477.9 Gastroesophageal reflux disease, esophagitis presence not specified     ICD-10-CM: K21.9 ICD-9-CM: 530.81 Plantar wart, left foot     ICD-10-CM: B07.0 ICD-9-CM: 078.12   
 OAB (overactive bladder)     ICD-10-CM: N32.81 ICD-9-CM: 596.51 Easy bruising     ICD-10-CM: R23.8 ICD-9-CM: 219. 9 Vitamin D deficiency     ICD-10-CM: E55.9 ICD-9-CM: 268.9 Vitals BP Pulse Temp Resp Height(growth percentile) Weight(growth percentile) 133/68 60 97.5 °F (36.4 °C) (Oral) 17 5' 7\" (1.702 m) 157 lb (71.2 kg) SpO2 BMI Smoking Status 96% 24.59 kg/m2 Former Smoker Vitals History BMI and BSA Data Body Mass Index Body Surface Area 24.59 kg/m 2 1.83 m 2 Preferred Pharmacy Pharmacy Name [facility], [address] [phone] Your Updated Medication List  
  
   
This list is accurate as of 6/12/18  9:26 AM.  Always use your most recent med list. amLODIPine 10 mg tablet Commonly known as:  Sobia Arabella Take 1 Tab by mouth daily. aspirin delayed-release 81 mg tablet Take  by mouth daily. diphenhydrAMINE 50 mg capsule Commonly known as:  BENADRYL Take 50 mg by mouth nightly as needed. diphenoxylate-atropine 2.5-0.025 mg per tablet Commonly known as:  LOMOTIL Take 1 Tab by mouth two (2) times daily as needed for Diarrhea. Max Daily Amount: 2 Tabs. For diarrhea  
  
 ibuprofen 200 mg Cap Take 400 mg by mouth two (2) times a day. lisinopril 20 mg tablet Commonly known as:  Colette [place] Take 1 Tab by mouth daily. MYRBETRIQ 50 mg ER tablet Generic drug:  mirabegron ER  
take 1 tablet once daily Omeprazole delayed release 20 mg tablet Commonly known as:  PRILOSEC D/R Take 1 Tab by mouth daily. tamsulosin 0.4 mg capsule Commonly known as:  FLOMAX  
take 1 capsule by mouth once daily AFTER DINNER Follow-up Instructions Return in about 6 months (around 12/12/2018) for follow up, annual wellness. To-Do List   
 06/12/2018 Lab:  CBC WITH AUTOMATED DIFF   
  
 06/12/2018 Lab:  LIPID PANEL   
  
 06/12/2018 Lab:  METABOLIC PANEL, COMPREHENSIVE   
  
 06/12/2018 Lab:  VITAMIN D, 25 HYDROXY Patient Instructions Plantar Warts: Care Instructions Your Care Instructions A plantar wart is a harmless skin growth. Plantar warts occur on the bottom of your feet and may be painful when you walk. A virus makes the top layer of skin grow quickly, causing a wart. Warts usually go away on their own in months or years. Warts are spread easily. You can infect yourself again by touching the wart and then touching another part of your body. You also can infect others by sharing towels, razors, or other personal items. Most plantar warts do not need treatment. But if warts cause you pain or spread, your doctor may recommend that you use an over-the-counter treatment. These include salicylic acid or duct tape. Your doctor may prescribe a stronger medicine to put on warts or may inject them with medicine. Your doctor also can remove warts through surgery or by freezing them. Follow-up care is a key part of your treatment and safety. Be sure to make and go to all appointments, and call your doctor if you are having problems. It's also a good idea to know your test results and keep a list of the medicines you take. How can you care for yourself at home? · Use salicylic acid or duct tape as your doctor directs. You put the medicine or the tape on a wart for a while and then file down the dead skin on the wart. You use the salicylic acid treatment for 2 to 3 months or the tape for 1 to 2 months.  
· If your doctor prescribes medicine to put on warts, use it exactly as prescribed. Call your doctor if you think you are having a problem with your medicine. · Wear comfortable shoes and socks. Avoid high heels or shoes that put a lot of pressure on your foot. · Pad the wart with doughnut-shaped felt or a moleskin patch. You can buy these at a drugstore. Put the pad around the plantar wart so that it relieves pressure on the wart. You also can place pads or cushions in your shoes to make walking more comfortable. · Take an over-the-counter medicine, such as acetaminophen (Tylenol), ibuprofen (Advil, Motrin), or naproxen (Aleve) if you have pain. Read and follow all instructions on the label. · Do not take two or more pain medicines at the same time unless the doctor told you to. Many pain medicines have acetaminophen, which is Tylenol. Too much acetaminophen (Tylenol) can be harmful. When should you call for help? Call your doctor now or seek immediate medical care if: 
? · You have signs of infection, such as: 
¨ Increased pain, swelling, warmth, or redness. ¨ Red streaks leading from a wart. ¨ Pus draining from a wart. ¨ A fever. ? Watch closely for changes in your health, and be sure to contact your doctor if: 
? · You do not get better as expected. Where can you learn more? Go to http://sotero-richie.info/. Enter S429 in the search box to learn more about \"Plantar Warts: Care Instructions. \" Current as of: October 13, 2016 Content Version: 11.4 © 4174-2141 [employer]. Care instructions adapted under license by [employer] (which disclaims liability or warranty for this information). If you have questions about a medical condition or this instruction, always ask your healthcare professional. [address] any warranty or liability for your use of this information. Introducing [facility]!    
 Anita Felipe introduces [employer] patient portal. Now you can access parts of your medical record, email your doctor's office, and request medication refills online. 1. In your internet browser, go to [email or web address] 2. Click on the First Time User? Click Here link in the Sign In box. You will see the New Member Sign Up page. 3. Enter your [email or web address] Access Code exactly as it appears below. You will not need to use this code after youve completed the sign-up process. If you do not sign up before the expiration date, you must request a new code. · [email or web address] Access Code: [email or web address] Expires: 9/10/2018  9:26 AM 
 
4. Enter the last four digits of your Social Security Number (xxxx) and Date of Birth (mm/dd/yyyy) as indicated and click Submit. You will be taken to the next sign-up page. 5. Create a Magick.nu ID. This will be your [email or web address] login ID and cannot be changed, so think of one that is secure and easy to remember. 6. Create a Mag[employer]nu password. You can change your password at any time. 7. Enter your Password Reset Question and Answer. This can be used at a later time if you forget your password. 8. Enter your e-mail address. You will receive e-mail notification when new information is available in [address] 9. Click Sign Up. You can now view and download portions of your medical record. 10. Click the Download Summary menu link to download a portable copy of your medical information. If you have questions, please visit the Frequently Asked Questions section of the [email or web address] website. Remember, [email or web address] is NOT to be used for urgent needs. For medical emergencies, dial 911. Now available from your iPhone and Android! Please provide this summary of care documentation to your next provider. Your primary care clinician is listed as Juancarlos Monae. If you have any questions after today's visit, please call [phone].

## 2018-06-12 NOTE — PROGRESS NOTES
Chief Complaint   Patient presents with    Follow-up     6 month; patient is not fasting       Pt is a 80y.o. year old male who presents for follow up of his chronic medical problems    Health Maintenance Due   Topic Date Due    DTaP/Tdap/Td series (1 - Tdap) 07/10/1953    ZOSTER VACCINE AGE 60>  05/10/1992    GLAUCOMA SCREENING Q2Y  07/10/1997-does not recall doc but has it done regularly    Pneumococcal 65+ Low/Medium Risk (1 of 2 - PCV13) 07/10/1997   Vaccinations at [address] needs Tdap he was told; advised to have them fax shot records here    BP Readings from Last 3 Encounters:   06/12/18 133/68   12/12/17 121/66   07/25/17 140/87   compliant with Lisinopril and Norvasc    Lab Results   Component Value Date/Time    Cholesterol, total 169 12/12/2017 10:23 AM    HDL Cholesterol 58 12/12/2017 10:23 AM    LDL, calculated 94.6 12/12/2017 10:23 AM    VLDL, calculated 16.4 12/12/2017 10:23 AM    Triglyceride 82 12/12/2017 10:23 AM    CHOL/HDL Ratio 2.9 12/12/2017 10:23 AM   Fasting today    Lab Results   Component Value Date/Time    Vitamin D 25-Hydroxy 23.2 (L) 12/12/2017 10:23 AM     S/p rx    Sees Urology for OAB-2 meds    GERD-taking PPI occasionally    On Lomotil prn    New problems? Went to ER in the spring-congestion usually in the spring; told he has bronchitis bec of a lot of phlegm  Occasional pain on a growth in the bottom of the left foot    Stays active by playing golf    ROS:    Pt denies: Wt loss, Fever/Chills, HA, Visual changes, Fatigue, Chest pain, SOB, PENA, Abd pain, N/V/D/C, Blood in stool or urine, Edema. Pertinent positive as above in HPI.  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 90 Tab 3    tamsulosin (FLOMAX) 0.4 mg capsule take 1 capsule by mouth once daily AFTER DINNER 90 Cap 3    diphenhydrAMINE (BENADRYL) 50 mg capsule Take 50 mg by mouth nightly as needed.  diphenoxylate-atropine (LOMOTIL) 2.5-0.025 mg per tablet Take 1 Tab by mouth two (2) times daily as needed for Diarrhea. Max Daily Amount: 2 Tabs. For diarrhea 30 Tab 1    amLODIPine (NORVASC) 10 mg tablet Take 1 Tab by mouth daily. 90 Tab 3    ibuprofen 200 mg cap Take 400 mg by mouth two (2) times a day.  aspirin delayed-release 81 mg tablet Take  by mouth daily.  Omeprazole delayed release (PRILOSEC D/R) 20 mg tablet Take 1 Tab by mouth daily. 80 Tab 1       History   Smoking Status    Former Smoker    Years: 15.00    Types: Cigarettes    Quit date: 1/1/1965   Smokeless Tobacco    Former User       No Known Allergies    Patient Labs were reviewed: yes      Patient Past Records were reviewed:  yes        Objective:     Vitals:    06/12/18 0847   BP: 133/68   Pulse: 60   Resp: 17   Temp: 97.5 °F (36.4 °C)   TempSrc: Oral   SpO2: 96%   Weight: 157 lb (71.2 kg)   Height: 5' 7\" (1.702 m)     Body mass index is 24.59 kg/(m^2). Exam:   Appearance: alert, well appearing,  oriented to person, place, and time, acyanotic, in no respiratory distress and well hydrated.   HEENT:  NC/AT, pink conj, anicteric sclerae, hearing aid both ears  Neck:  No cervical lymphadenopathy, no JVD, no thyromegaly, no carotid bruit  Heart:  RRR without M/R/G  Lungs:  CTAB, no rhonchi, rales, or wheezes with good air exchange   Abdomen:  Non-tender, pos bowel sounds, no hepatosplenomegaly  Ext:  No C/C/E    Skin: no rash, some hematomas on the forearms  Neuro: no lateralizing signs, CNs II-XII intact      Assessment/ Plan:   Diagnoses and all orders for this visit:    1. Essential hypertension with goal blood pressure less than 140/90  -     CBC WITH AUTOMATED DIFF; Future  -     METABOLIC PANEL, COMPREHENSIVE; Future  -     LIPID PANEL; Future    2. Seasonal allergic rhinitis, unspecified trigger    3. Gastroesophageal reflux disease, esophagitis presence not specified    4. Plantar wart, left foot-advised on care; he can also see his derm    5. OAB (overactive bladder)-urology following    6. Easy bruising-likely from ASA and aging    7. Vitamin D deficiency-s/p rx  -     VITAMIN D, 25 HYDROXY; Future      Follow-up Disposition:  Return in about 6 months (around 12/12/2018) for follow up, annual wellness. I have discussed the diagnosis with the patient and the intended plan as seen in the above orders. The patient has received an After-Visit Summary and questions were answered concerning future plans. Medication Side Effects and Warnings were discussed with patient: yes    Patient verbalized understanding of above instructions.     Ni Still MD  Internal Medicine  [facility]

## 2018-06-12 NOTE — PATIENT INSTRUCTIONS
Plantar Warts: Care Instructions  Your Care Instructions  A plantar wart is a harmless skin growth. Plantar warts occur on the bottom of your feet and may be painful when you walk. A virus makes the top layer of skin grow quickly, causing a wart. Warts usually go away on their own in months or years. Warts are spread easily. You can infect yourself again by touching the wart and then touching another part of your body. You also can infect others by sharing towels, razors, or other personal items. Most plantar warts do not need treatment. But if warts cause you pain or spread, your doctor may recommend that you use an over-the-counter treatment. These include salicylic acid or duct tape. Your doctor may prescribe a stronger medicine to put on warts or may inject them with medicine. Your doctor also can remove warts through surgery or by freezing them. Follow-up care is a key part of your treatment and safety. Be sure to make and go to all appointments, and call your doctor if you are having problems. It's also a good idea to know your test results and keep a list of the medicines you take. How can you care for yourself at home? · Use salicylic acid or duct tape as your doctor directs. You put the medicine or the tape on a wart for a while and then file down the dead skin on the wart. You use the salicylic acid treatment for 2 to 3 months or the tape for 1 to 2 months. · If your doctor prescribes medicine to put on warts, use it exactly as prescribed. Call your doctor if you think you are having a problem with your medicine. · Wear comfortable shoes and socks. Avoid high heels or shoes that put a lot of pressure on your foot. · Pad the wart with doughnut-shaped felt or a moleskin patch. You can buy these at a drugstore. Put the pad around the plantar wart so that it relieves pressure on the wart. You also can place pads or cushions in your shoes to make walking more comfortable.   · Take an over-the-counter medicine, such as acetaminophen (Tylenol), ibuprofen (Advil, Motrin), or naproxen (Aleve) if you have pain. Read and follow all instructions on the label. · Do not take two or more pain medicines at the same time unless the doctor told you to. Many pain medicines have acetaminophen, which is Tylenol. Too much acetaminophen (Tylenol) can be harmful. When should you call for help? Call your doctor now or seek immediate medical care if:  ? · You have signs of infection, such as:  ¨ Increased pain, swelling, warmth, or redness. ¨ Red streaks leading from a wart. ¨ Pus draining from a wart. ¨ A fever. ? Watch closely for changes in your health, and be sure to contact your doctor if:  ? · You do not get better as expected. Where can you learn more? Go to http://sotero-richie.info/. Enter S429 in the search box to learn more about \"Plantar Warts: Care Instructions. \"  Current as of: October 13, 2016  Content Version: 11.4  © 5644-5403 [employer]. Care instructions adapted under license by [employer] (which disclaims liability or warranty for this information). If you have questions about a medical condition or this instruction, always ask your healthcare professional. [address] any warranty or liability for your use of this information.

## 2018-06-12 NOTE — PROGRESS NOTES
Chief Complaint   Patient presents with    Follow-up     6 month; patient is not fasting     1. Have you been to the ER, urgent care clinic since your last visit? Hospitalized since your last visit? Yes Where: [facility] for [facility]itis    2. Have you seen or consulted any other health care providers outside of the [address] [place] [address] since your last visit? Include any pap smears or colon screening.  No

## 2018-06-13 DIAGNOSIS — E55.9 VITAMIN D DEFICIENCY: Primary | ICD-10-CM

## 2018-06-13 LAB — 25(OH)D3 SERPL-MCNC: 24.5 NG/ML (ref 30–100)

## 2018-06-13 RX ORDER — ERGOCALCIFEROL 1.25 MG/1
50000 CAPSULE ORAL
Qty: 12 CAP | Refills: 1 | Status: SHIPPED | OUTPATIENT
Start: 2018-06-13 | End: 2018-12-10 | Stop reason: SDUPTHER

## 2018-06-13 NOTE — PROGRESS NOTES
pls let patient know Vit D is still low so I will send the rx again  cholesterol and blood sugar went up a bit-watch diet and recheck fasting next visit

## 2018-06-25 ENCOUNTER — TELEPHONE (OUTPATIENT)
Dept: FAMILY MEDICINE CLINIC | Age: 83
End: 2018-06-25

## 2018-06-25 NOTE — TELEPHONE ENCOUNTER
Patient is stating Dr. Tennille Escamilla left him a message last week, patient is stating he needs that message repeated. Call him back at [phone].

## 2018-11-12 RX ORDER — AMLODIPINE BESYLATE 10 MG/1
10 TABLET ORAL DAILY
Qty: 90 TAB | Refills: 3 | Status: SHIPPED | OUTPATIENT
Start: 2018-11-12 | End: 2019-11-19 | Stop reason: SDUPTHER

## 2019-01-16 RX ORDER — LISINOPRIL 20 MG/1
20 TABLET ORAL DAILY
Qty: 90 TAB | Refills: 3 | Status: SHIPPED | OUTPATIENT
Start: 2019-01-16 | End: 2020-01-16 | Stop reason: SDUPTHER

## 2019-02-19 ENCOUNTER — OFFICE VISIT (OUTPATIENT)
Dept: FAMILY MEDICINE CLINIC | Age: 84
End: 2019-02-19

## 2019-02-19 VITALS
SYSTOLIC BLOOD PRESSURE: 134 MMHG | WEIGHT: 157 LBS | DIASTOLIC BLOOD PRESSURE: 78 MMHG | BODY MASS INDEX: 24.64 KG/M2 | TEMPERATURE: 96.2 F | HEIGHT: 67 IN | OXYGEN SATURATION: 98 % | HEART RATE: 67 BPM | RESPIRATION RATE: 16 BRPM

## 2019-02-19 DIAGNOSIS — Z00.00 MEDICARE ANNUAL WELLNESS VISIT, SUBSEQUENT: Primary | ICD-10-CM

## 2019-02-19 NOTE — PROGRESS NOTES
Chief Complaint Patient presents with Paul Annual Wellness Visit 1. Have you been to the ER, urgent care clinic since your last visit? Hospitalized since your last visit? Went to [facility] walk-in [facility] for chest cold. 2. Have you seen or consulted any other health care providers outside of the [address] [place] [address] since your last visit? Chiropractor. Health Maintenance: · Patient is due for glaucoma screening. · Patient stated that he received flu shot at [facility]. · Patient agreed to receive Tdap injection today during visit. Fall Risk Assessment, last 12 mths 2/19/2019 Able to walk? Yes Fall in past 12 months? No  
 
3 most recent PHQ Screens 2/19/2019 Little interest or pleasure in doing things Not at all Feeling down, depressed, irritable, or hopeless Not at all Total Score PHQ 2 0 Visit Vitals /78 Pulse 67 Temp 96.2 °F (35.7 °C) (Oral) Resp 16 Ht 5' 7\" (1.702 m) Wt 157 lb (71.2 kg) SpO2 98% BMI 24.59 kg/m²

## 2019-02-19 NOTE — ACP (ADVANCE CARE PLANNING)
Advance Care Planning (ACP) Provider Conversation Snapshot    Date of ACP Conversation: 02/19/19  Persons included in Conversation:  patient  Length of ACP Conversation in minutes:  <16 minutes (Non-Billable)    Authorized Decision Maker (if patient is incapable of making informed decisions):    This person is:   Healthcare Agent/Medical Power of  under Advance Directive  His daughter everardo who is a nurse          For Patients with Decision Making Capacity:   Values/Goals: Exploration of values, goals, and preferences if recovery is not expected, even with continued medical treatment in the event of:  Imminent death  Severe, permanent brain injury    Conversation Outcomes / Follow-Up Plan:   No changes to current Adv directive

## 2019-02-19 NOTE — PROGRESS NOTES
This is the Subsequent Medicare Annual Wellness Exam, performed 12 months or more after the Initial AWV or the last Subsequent AWV I have reviewed the patient's medical history in detail and updated the computerized patient record. BP Readings from Last 3 Encounters:  
02/19/19 134/78  
08/30/18 107/81  
07/02/18 126/72 Still playing golf Wt Readings from Last 3 Encounters:  
02/19/19 157 lb (71.2 kg) 08/30/18 151 lb 14.4 oz (68.9 kg) 07/02/18 155 lb (70.3 kg) Lab Results Component Value Date/Time Cholesterol, total 190 06/12/2018 09:28 AM  
 HDL Cholesterol 58 06/12/2018 09:28 AM  
 LDL, calculated 116.6 (H) 06/12/2018 09:28 AM  
 VLDL, calculated 15.4 06/12/2018 09:28 AM  
 Triglyceride 77 06/12/2018 09:28 AM  
 CHOL/HDL Ratio 3.3 06/12/2018 09:28 AM  
 
Lab Results Component Value Date/Time Vitamin D 25-Hydroxy 24.5 (L) 06/12/2018 09:28 AM  
  On Rx Prilosec prn History Past Medical History:  
Diagnosis Date  BPH (benign prostatic hypertrophy)  Carpal tunnel syndrome  Hypertension Past Surgical History:  
Procedure Laterality Date  HX APPENDECTOMY  [id number]  HX CARPAL TUNNEL RELEASE  HX COLONOSCOPY Current Outpatient Medications Medication Sig Dispense Refill  DIPHENHYDRAMINE HCL PO 50 mg by Mouth/Throat route as needed.  MYRBETRIQ 50 mg ER tablet take 1 tablet by mouth once daily 30 Tab 1  
 lisinopril (PRINIVIL, ZESTRIL) 20 mg tablet Take 1 Tab by mouth daily. 90 Tab 3  
 tamsulosin (FLOMAX) 0.4 mg capsule take 1 capsule by mouth once daily AFTER DINNER 90 Cap 2  
 ergocalciferol (ERGOCALCIFEROL) 50,000 unit capsule Take 1 Cap by mouth every seven (7) days. 12 Cap 1  
 amLODIPine (NORVASC) 10 mg tablet Take 1 Tab by mouth daily. 90 Tab 3  
 Omeprazole delayed release (PRILOSEC D/R) 20 mg tablet Take 1 Tab by mouth daily. 90 Tab 1  ibuprofen 200 mg cap Take 400 mg by mouth two (2) times a day.  aspirin delayed-release 81 mg tablet Take  by mouth daily. No Known Allergies Family History Family history unknown: Yes  
 
Social History Tobacco Use  Smoking status: Former Smoker Years: 15.00 Types: Cigarettes Last attempt to quit: 1965 Years since quittin.1  Smokeless tobacco: Former User Substance Use Topics  Alcohol use: Yes Alcohol/week: 0.0 - 3.0 oz Patient Active Problem List  
Diagnosis Code  OAB (overactive bladder) N32.81  
 Essential hypertension with goal blood pressure less than 140/90 I10  Gastroesophageal reflux disease K21.9  Allergic rhinitis J30.9  Benign non-nodular prostatic hyperplasia N40.0  Diastasis recti M62.08  
 S/p bilateral carpal tunnel release Z98.890  
 Advance directive discussed with patient Z71.89  
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  
 
3 most recent PHQ Screens 2019 Little interest or pleasure in doing things Not at all Feeling down, depressed, irritable, or hopeless Not at all Total Score PHQ 2 0 Alcohol Risk Factor Screening: You do not drink alcohol or very rarely. Functional Ability and Level of Safety:  
Hearing Loss The patient wears hearing aids. Activities of Daily Living The home contains: no safety equipment. Patient does total self care Fall Risk Fall Risk Assessment, last 12 mths 2019 Able to walk? Yes Fall in past 12 months? No  
 
 
Abuse Screen Patient is not abused Cognitive Screening Evaluation of Cognitive Function: 
Has your family/caregiver stated any concerns about your memory: no 
Normal 
 
Patient Care Team  
Patient Care Team: 
Elza Catalan MD as PCP - General (Internal Medicine) Smhuel Dallas MD (Urology) Bruna Calixto MD (Ophthalmology) Tone Foster MD (Neurology) Assessment/Plan Education and counseling provided: Are appropriate based on today's review and evaluation End-of-Life planning (with patient's consent)-SEE acp NOTE Pneumococcal Vaccine Influenza Vaccine Prostate cancer screening tests (PSA, covered annually)-sees Urology Colorectal cancer screening tests Cardiovascular screening blood test 
Screening for glaucoma Diabetes screening test 
 
Diagnoses and all orders for this visit: 
 
1. Medicare annual wellness visit, subsequent-Refer to above for plan and to patient instructions for recommendations on  Health Maintenance Due Topic Date Due  
 DTaP/Tdap/Td series (1 - Tdap) 07/10/1953  Shingrix Vaccine Age 50> (1 of 2) 07/10/1982  GLAUCOMA SCREENING Q2Y  07/10/1997  Pneumococcal 65+ Low/Medium Risk (1 of 2 - PCV13) 07/10/1997  Influenza Age 5 to Adult  08/01/2018  MEDICARE YEARLY EXAM  12/13/2018 RTC yearly for wellness visit

## 2019-02-19 NOTE — PATIENT INSTRUCTIONS
Medicare Wellness Visit, Male The best way to live healthy is to have a lifestyle where you eat a well-balanced diet, exercise regularly, limit alcohol use, and quit all forms of tobacco/nicotine, if applicable. Regular preventive services are another way to keep healthy. Preventive services (vaccines, screening tests, monitoring & exams) can help personalize your care plan, which helps you manage your own care. Screening tests can find health problems at the earliest stages, when they are easiest to treat. [facility] follows the current, evidence-based guidelines published by the [facility] ([facility]) when recommending preventive services for our patients. Because we follow these guidelines, sometimes recommendations change over time as research supports it. (For example, a prostate screening blood test is no longer routinely recommended for men with no symptoms.) Of course, you and your doctor may decide to screen more often for some diseases, based on your risk and co-morbidities (chronic disease you are already diagnosed with). Preventive services for you include: - Medicare offers their members a free annual wellness visit, which is time for you and your primary care provider to discuss and plan for your preventive service needs. Take advantage of this benefit every year! 
-All adults over age 72 should receive the recommended pneumonia vaccines. Current USPSTF guidelines recommend a series of two vaccines for the best pneumonia protection.  
-All adults should have a flu vaccine yearly and an ECG.  All adults age 61 and older should receive a shingles vaccine once in their lifetime.   
-All adults age 38-68 who are overweight should have a diabetes screening test once every three years.  
-Other screening tests & preventive services for persons with diabetes include: an eye exam to screen for diabetic retinopathy, a kidney function test, a foot exam, and stricter control over your cholesterol.  
-Cardiovascular screening for adults with routine risk involves an electrocardiogram (ECG) at intervals determined by the provider.  
-Colorectal cancer screening should be done for adults age 54-65 with no increased risk factors for colorectal cancer. There are a number of acceptable methods of screening for this type of cancer. Each test has its own benefits and drawbacks. Discuss with your provider what is most appropriate for you during your annual wellness visit. The different tests include: colonoscopy (considered the best screening method), a fecal occult blood test, a fecal DNA test, and sigmoidoscopy. 
-All adults born between [facility] should be screened once for Hepatitis C. 
-An Abdominal Aortic Aneurysm (AAA) Screening is recommended for men age 73-68 who has ever smoked in their lifetime. Here is a list of your current Health Maintenance items (your personalized list of preventive services) with a due date: 
Health Maintenance Due Topic Date Due  
 DTaP/Tdap/Td  (1 - Tdap) 07/10/1953  Shingles Vaccine (1 of 2) 07/10/1982  Glaucoma Screening   07/10/1997  Pneumococcal Vaccine (1 of 2 - PCV13) 07/10/1997  Flu Vaccine  08/01/2018 [address] [place] [address] Annual Well Visit  12/13/2018

## 2019-08-06 ENCOUNTER — OFFICE VISIT (OUTPATIENT)
Dept: FAMILY MEDICINE CLINIC | Age: 84
End: 2019-08-06

## 2019-08-06 VITALS
RESPIRATION RATE: 16 BRPM | BODY MASS INDEX: 24.33 KG/M2 | HEIGHT: 67 IN | HEART RATE: 60 BPM | SYSTOLIC BLOOD PRESSURE: 140 MMHG | DIASTOLIC BLOOD PRESSURE: 70 MMHG | WEIGHT: 155 LBS | OXYGEN SATURATION: 98 % | TEMPERATURE: 96 F

## 2019-08-06 DIAGNOSIS — M25.512 ACUTE PAIN OF LEFT SHOULDER: ICD-10-CM

## 2019-08-06 DIAGNOSIS — I10 ESSENTIAL HYPERTENSION WITH GOAL BLOOD PRESSURE LESS THAN 140/90: Primary | ICD-10-CM

## 2019-08-06 DIAGNOSIS — R41.3 MEMORY LOSS: ICD-10-CM

## 2019-08-06 DIAGNOSIS — N32.81 OAB (OVERACTIVE BLADDER): ICD-10-CM

## 2019-08-06 NOTE — PROGRESS NOTES
Chief Complaint   Patient presents with    Hypertension     6 month f/u       Pt is a 80y.o. year old male who presents for follow up of his chronic medical problems    BP Readings from Last 3 Encounters:   08/08/19 130/74   08/06/19 140/70   02/19/19 134/78     Being treated by urology for OAB-incontinence and frequency (nuisance)-on Myrbetriq and Flomax, working    Left shoulder pain when he raises it and gets it down-began to improve on its own  Will do PT if it comes back     Serious hearing prob with and without hearing aids  Saw audiologist and got tested again-no change but brain unable to process words; problems recalling words when having conversation with someone  Wants to try Piter Flores he saw on TV    Agrees to do memory testing next visit but not today    Denies getting lost while driving    Wt Readings from Last 3 Encounters:   08/08/19 145 lb (65.8 kg)   08/06/19 155 lb (70.3 kg)   02/19/19 157 lb (71.2 kg)   \"big belly\"-wants to lose it he says    Lab Results   Component Value Date/Time    Cholesterol, total 190 06/12/2018 09:28 AM    HDL Cholesterol 58 06/12/2018 09:28 AM    LDL, calculated 116.6 (H) 06/12/2018 09:28 AM    VLDL, calculated 15.4 06/12/2018 09:28 AM    Triglyceride 77 06/12/2018 09:28 AM    CHOL/HDL Ratio 3.3 06/12/2018 09:28 AM   not on statin  Not fasting today      Health Maintenance Due   Topic Date Due    DTaP/Tdap/Td series (1 - Tdap) 07/10/1953    Shingrix Vaccine Age 50> (1 of 2) 07/10/1982    GLAUCOMA SCREENING Q2Y  07/10/1997    Pneumococcal 65+ years (1 of 2 - PCV13) 07/10/1997    Influenza Age 5 to Adult -in Oct 08/01/2019   Patient says he has had the other vaccines from previous PCP    ROS:    Pt denies: Wt loss, Fever/Chills, HA, Visual changes, Fatigue, Chest pain, SOB, PENA, Abd pain, N/V/D/C, Blood in stool or urine, Edema. Pertinent positive as above in HPI.  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  lisinopril (PRINIVIL, ZESTRIL) 20 mg tablet Take 1 Tab by mouth daily. 90 Tab 3    amLODIPine (NORVASC) 10 mg tablet Take 1 Tab by mouth daily. 90 Tab 3    Omeprazole delayed release (PRILOSEC D/R) 20 mg tablet Take 1 Tab by mouth daily. 90 Tab 1    ibuprofen 200 mg cap Take 400 mg by mouth two (2) times a day.  aspirin delayed-release 81 mg tablet Take  by mouth daily.  mirabegron ER (MYRBETRIQ) 50 mg ER tablet Take 1 Tab by mouth daily. 90 Tab 3    tamsulosin (FLOMAX) 0.4 mg capsule Take 1 Cap by mouth daily. 90 Cap 3       Social History     Tobacco Use   Smoking Status Former Smoker    Years: 15.00    Types: Cigarettes    Last attempt to quit: 1965    Years since quittin.5   Smokeless Tobacco Former User       No Known Allergies    Patient Labs were reviewed: yes      Patient Past Records were reviewed:  yes        Objective:     Vitals:    19 1110   BP: 140/70   Pulse: 60   Resp: 16   Temp: 96 °F (35.6 °C)   TempSrc: Oral   SpO2: 98%   Weight: 155 lb (70.3 kg)   Height: 5' 7\" (1.702 m)     Body mass index is 24.28 kg/m². Exam:   Appearance: alert, well appearing,  oriented to person, place, and time, acyanotic, in no respiratory distress and well hydrated.   HEENT:  NC/AT, pink conj, anicteric sclerae  Neck:  No cervical lymphadenopathy, no JVD, no thyromegaly, no carotid bruit  Heart:  RRR without M/R/G  Lungs:  CTAB, no rhonchi, rales, or wheezes with good air exchange   Abdomen:  Non-tender, pos bowel sounds, no hepatosplenomegaly  Ext:  No C/C/E, mild dec ROM of the left shoulder secondary to pain    Skin: no rash  Neuro: no lateralizing signs, CNs II-XII intact      Assessment/ Plan:   Diagnoses and all orders for this visit:    1. Essential hypertension with goal blood pressure less than 140/90-controlled, continue with present meds    2. OAB (overactive bladder)-Urology following, continue with current meds    3. Acute pain of left shoulder-given exercises to do    4. Memory loss-do MMSE next visit; ok to try Prevagen as he is not on a lot of meds that it could interact with    S/p vit d rx    GERd-on daily Prilosec    Follow-up and Dispositions    · Return in about 3 months (around 11/6/2019) for follow up. I have discussed the diagnosis with the patient and the intended plan as seen in the above orders. The patient has received an After-Visit Summary and questions were answered concerning future plans. Medication Side Effects and Warnings were discussed with patient: yes    Patient verbalized understanding of above instructions.     Ronny Mcgill MD  Internal Medicine  [facility]

## 2019-08-06 NOTE — PATIENT INSTRUCTIONS
Shoulder Stretches: Exercises Your Care Instructions Here are some examples of exercises for your shoulder. Start each exercise slowly. Ease off the exercise if you start to have pain. Your doctor or physical therapist will tell you when you can start these exercises and which ones will work best for you. How to do the exercises Shoulder stretch 1.  a doorway and place one arm against the door frame. Your elbow should be a little higher than your shoulder. 2. Relax your shoulders as you lean forward, allowing your chest and shoulder muscles to stretch. You can also turn your body slightly away from your arm to stretch the muscles even more. 3. Hold for 15 to 30 seconds. 4. Repeat 2 to 4 times with each arm. Shoulder and chest stretch 1. Shoulder and chest stretch 2. While sitting, relax your upper body so you slump slightly in your chair. 3. As you breathe in, straighten your back and open your arms out to the sides. 4. Gently pull your shoulder blades back and downward. 5. Hold for 15 to 30 seconds as your breathe normally. 6. Repeat 2 to 4 times. Overhead stretch 1. Reach up over your head with both arms. 2. Hold for 15 to 30 seconds. 3. Repeat 2 to 4 times. Follow-up care is a key part of your treatment and safety. Be sure to make and go to all appointments, and call your doctor if you are having problems. It's also a good idea to know your test results and keep a list of the medicines you take. Where can you learn more? Go to http://sotero-richie.info/. Enter S254 in the search box to learn more about \"Shoulder Stretches: Exercises. \" Current as of: September 20, 2018 Content Version: 12.1 © 0388-9160 [employer]. Care instructions adapted under license by [employer] (which disclaims liability or warranty for this information).  If you have questions about a medical condition or this instruction, always ask your healthcare professional. [place] [address] any warranty or liability for your use of this information.

## 2019-08-06 NOTE — PROGRESS NOTES
1. Have you been to the ER, urgent care clinic or hospitalized since your last visit? Unknown    2. Have you seen or consulted any other health care providers outside of the [address] [place] [address] since your last visit (Include any pap smears or colon screening)? NO      Do you have an Advanced Directive? YES    Would you like information on Advanced Directives? NO    Health Maintenance Due   Topic Date Due    DTaP/Tdap/Td series (1 - Tdap) 07/10/1953    Shingrix Vaccine Age 50> (1 of 2) 07/10/1982    GLAUCOMA SCREENING Q2Y  07/10/1997    Pneumococcal 65+ years (1 of 2 - PCV13) 07/10/1997    Influenza Age 5 to Adult  08/01/2019     Patient states he was seen at [facility] for glaucoma screening.

## 2019-10-22 ENCOUNTER — CLINICAL SUPPORT (OUTPATIENT)
Dept: FAMILY MEDICINE CLINIC | Age: 84
End: 2019-10-22

## 2019-10-22 DIAGNOSIS — Z23 ENCOUNTER FOR IMMUNIZATION: ICD-10-CM

## 2019-10-22 NOTE — PROGRESS NOTES
Eliseo Walter is a 80 y.o. male who presents for routine immunizations. He denies any symptoms , reactions or allergies that would exclude them from being immunized today. Risks and adverse reactions were discussed and the VIS was given to them. All questions were addressed. He was observed for 15 min post injection. There were no reactions observed.     Jaya Giron LPN

## 2019-11-05 ENCOUNTER — OFFICE VISIT (OUTPATIENT)
Dept: FAMILY MEDICINE CLINIC | Age: 84
End: 2019-11-05

## 2019-11-05 VITALS
BODY MASS INDEX: 24.33 KG/M2 | SYSTOLIC BLOOD PRESSURE: 129 MMHG | HEART RATE: 63 BPM | RESPIRATION RATE: 17 BRPM | TEMPERATURE: 97.3 F | OXYGEN SATURATION: 97 % | DIASTOLIC BLOOD PRESSURE: 71 MMHG | WEIGHT: 155 LBS | HEIGHT: 67 IN

## 2019-11-05 DIAGNOSIS — N32.81 OAB (OVERACTIVE BLADDER): Primary | ICD-10-CM

## 2019-11-05 DIAGNOSIS — R35.0 BENIGN PROSTATIC HYPERPLASIA WITH URINARY FREQUENCY: ICD-10-CM

## 2019-11-05 DIAGNOSIS — R41.3 MEMORY LOSS: ICD-10-CM

## 2019-11-05 DIAGNOSIS — R14.2 BELCHING: ICD-10-CM

## 2019-11-05 DIAGNOSIS — K22.70 BARRETT'S ESOPHAGUS WITHOUT DYSPLASIA: ICD-10-CM

## 2019-11-05 DIAGNOSIS — N40.1 BENIGN PROSTATIC HYPERPLASIA WITH URINARY FREQUENCY: ICD-10-CM

## 2019-11-05 DIAGNOSIS — H91.93 BILATERAL HEARING LOSS, UNSPECIFIED HEARING LOSS TYPE: ICD-10-CM

## 2019-11-05 DIAGNOSIS — I10 ESSENTIAL HYPERTENSION WITH GOAL BLOOD PRESSURE LESS THAN 140/90: ICD-10-CM

## 2019-11-05 NOTE — Clinical Note
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 Please see if you could see him sooner than the 8/2020 appt your office set him up for. María Huang MD

## 2019-11-05 NOTE — PROGRESS NOTES
Chief Complaint   Patient presents with    Follow Up Chronic Condition     1. Have you been to the ER, urgent care clinic since your last visit? Hospitalized since your last visit? No    2. Have you seen or consulted any other health care providers outside of the [address] [place] [address] since your last visit? Include any pap smears or colon screening.  Yes Where: Dermatology visit

## 2019-11-05 NOTE — PATIENT INSTRUCTIONS
Garcia's Esophagus: Care Instructions  Your Care Instructions    The esophagus is the tube that connects the throat to the stomach. Food and liquids go through this tube. In Garcia's esophagus, the cells that line the tube change. This is usually because of gastroesophageal reflux disease (GERD). GERD causes acid from your stomach to back up into the esophagus. When you have Garcia's esophagus, you are slightly more likely to get cancer of the esophagus. So regular testing is important to watch for signs of this cancer. You can treat GERD to control your symptoms and feel better. Follow-up care is a key part of your treatment and safety. Be sure to make and go to all appointments, and call your doctor if you are having problems. It's also a good idea to know your test results and keep a list of the medicines you take. How can you care for yourself at home? · Take your medicines exactly as prescribed. Call your doctor if you think you are having a problem with your medicine. · If you take over-the-counter medicine, such as antacids or acid reducers, follow all instructions on the label. If you use these medicines often, talk with your doctor. Be careful when you take over-the-counter antacid medicines. Many of these medicines have aspirin in them. Read the label to make sure that you are not taking more than the recommended dose. Too much aspirin can be harmful. · Do not smoke or chew tobacco. Smoking can make GERD worse. If you need help quitting, talk to your doctor about stop-smoking programs and medicines. These can increase your chances of quitting for good. · Chocolate, mint, and alcohol can make GERD worse. They relax the valve between the esophagus and the stomach. · Spicy foods, foods that have a lot of acid (like tomatoes and oranges), and coffee can make GERD symptoms worse in some people.  If your symptoms are worse after you eat a certain food, you may want to stop eating that food to see if your symptoms get better. · Eat smaller meals, and more often. After eating, wait 2 to 3 hours before you lie down. · Raise the head of your bed 6 to 8 inches by putting blocks under the frame or a foam wedge under the head of the mattress. · Do not wear tight clothing around your midsection. · If you are overweight, lose weight. Even losing 5 to 10 pounds can help. When should you call for help? Call your doctor now or seek immediate medical care if:    · You have new or worse belly pain.     · You are vomiting.    Watch closely for changes in your health, and be sure to contact your doctor if:    · You have any pain or difficulty swallowing.     · You are losing weight.     · You have new or worse symptoms, such as heartburn.     · You do not get better as expected. Where can you learn more? Go to http://sotero-richie.info/. Enter L146 in the search box to learn more about \"Garcia's Esophagus: Care Instructions. \"  Current as of: November 7, 2018  Content Version: 12.2  © 7600-1286 [employer]. Care instructions adapted under license by [employer] (which disclaims liability or warranty for this information). If you have questions about a medical condition or this instruction, always ask your healthcare professional. [address] any warranty or liability for your use of this information.

## 2019-11-05 NOTE — PROGRESS NOTES
Chief Complaint   Patient presents with    Follow Up Chronic Condition       Pt is a 80y.o. year old male who presents for follow up of his chronic medical problems    Health Maintenance Due   Topic Date Due    DTaP/Tdap/Td series (1 - Tdap) 07/10/1953    Shingrix Vaccine Age 50> (1 of 2) 07/10/1982    GLAUCOMA SCREENING Q2Y  07/10/1997    Pneumococcal 65+ years (1 of 2 - PCV13) 07/10/1997     Wt Readings from Last 3 Encounters:   11/05/19 155 lb (70.3 kg)   08/08/19 145 lb (65.8 kg)   08/06/19 155 lb (70.3 kg)       BP Readings from Last 3 Encounters:   11/05/19 129/71   08/08/19 130/74   08/06/19 140/70       Freq urination and occl incontinence-Flomax and Myrbetriq-\"very annoying\"  Last seen a month ago by urology on follow up-no med changes done; \"perhaps I painted a better picture\"  Told he has BPH and OAB  When he gets up is when there is a problem, none when he is sitting  At times goes every 20 minutes    Lab Results   Component Value Date/Time    Prostate Specific Ag 10.3 (H) 12/12/2017 10:23 AM    Prostate Specific Ag 10.57 (H) 11/01/2016 03:27 PM   Urology note reviewed which said sxs are well controlled-?painted a good picture with them      Cannot hear well with or without his hearing aids  Audiologist tested him again-hearing remains the same-brain is not processing certain words  Will go to another audiologist fora possible better hearing aid  Saw ENT first who took out the wax then sent for hearing test      Belching/burping  Saw GI-had EGD, found nothing  -due for another test/why should he go is his question to me  GI note reviewed:  9/2018-EGD showed Garcia's with no dysplasia  Takes prn Prilosec only when he feels the acid      3 months ago after lifting a ladder-left shoulder pain; unable to lift arm  Saw chiropractor-got better  Then lifted ladder again, something popped and entire arm became black and blue-went to Urgent Care-sent to ortho  ASSESSMENT AND PLAN: This is a 80 y.o. left hand dominant male who presents today for evaluation of the right shoulder. 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 The patient has expressed clear understanding of this diagnosis as well as the risks and benefits associated with various treatment options. The patient understands the options available to treat this include: observation, behavior modifications, oral anti-inflammatory medication, physical therapy with or without corticosteroid injections as well as the possibility of surgical intervention. At this time, the patient does not feel that his pain is significant enough to warrant further aggressive interventions. The patient would like to hold off on injections or physical therapy at this time. The patient is aware that should the pain worsen and/or he would like to get more aggressive, he can contact the office and we will see him back for follow up. Got Better without doing anything  Has been back playing golf      Memory loss  Tried Prevagen since last visit-\"inconclusive\"  Began to concentrate on things he normally forgets   Forgot his son's name the other day which concerns him  Declined to see neuro at this time      Lab Results   Component Value Date/Time    Cholesterol, total 190 06/12/2018 09:28 AM    HDL Cholesterol 58 06/12/2018 09:28 AM    LDL, calculated 116.6 (H) 06/12/2018 09:28 AM    VLDL, calculated 15.4 06/12/2018 09:28 AM    Triglyceride 77 06/12/2018 09:28 AM    CHOL/HDL Ratio 3.3 06/12/2018 09:28 AM   Fasting?no,  had cereal      ROS:    Pt denies: Wt loss, Fever/Chills, HA, Visual changes, Fatigue, Chest pain, SOB, PENA, Abd pain, N/V/D/C, Blood in stool or urine, Edema. Pertinent positive as above in HPI.  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 90 Tab 3    tamsulosin (FLOMAX) 0.4 mg capsule Take 1 Cap by mouth daily. 90 Cap 3    DIPHENHYDRAMINE HCL PO 50 mg by Mouth/Throat route as needed.  lisinopril (PRINIVIL, ZESTRIL) 20 mg tablet Take 1 Tab by mouth daily. 90 Tab 3    amLODIPine (NORVASC) 10 mg tablet Take 1 Tab by mouth daily. 90 Tab 3    Omeprazole delayed release (PRILOSEC D/R) 20 mg tablet Take 1 Tab by mouth daily. 90 Tab 1    ibuprofen 200 mg cap Take 400 mg by mouth two (2) times a day.  aspirin delayed-release 81 mg tablet Take  by mouth daily. Social History     Tobacco Use   Smoking Status Former Smoker    Years: 15.00    Types: Cigarettes    Last attempt to quit: 1965    Years since quittin.8   Smokeless Tobacco Former User       No Known Allergies    Patient Labs were reviewed: yes      Patient Past Records were reviewed:  yes        Objective:     Vitals:    19 1013   BP: 129/71   Pulse: 63   Resp: 17   Temp: 97.3 °F (36.3 °C)   SpO2: 97%   Weight: 155 lb (70.3 kg)   Height: 5' 7\" (1.702 m)     Body mass index is 24.28 kg/m². Exam:   Appearance: alert, well appearing,  oriented to person, place, and time, acyanotic, in no respiratory distress and well hydrated.   HEENT:  NC/AT, pink conj, anicteric sclerae, bilat hearing aids  Neck:  No cervical lymphadenopathy, no JVD, no thyromegaly, no carotid bruit  Heart:  RRR without M/R/G  Lungs:  CTAB, no rhonchi, rales, or wheezes with good air exchange   Abdomen:  Non-tender, pos bowel sounds, no hepatosplenomegaly  Ext:  No C/C/E    Skin: no rash  Neuro: no lateralizing signs, CNs II-XII intact      Assessment/ Plan:   Diagnoses and all orders for this visit:    1. OAB (overactive bladder)-on max dose Myrbetriq but symptomatic; I promised him I will send a note to the new neurologist he is assigned to so they could see him sooner    2. Benign prostatic hyperplasia with urinary frequency-on Flomax, as above    3. 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 Memory loss-wants to hold off on seeing neuro/dementia eval    5. Bilateral hearing loss, unspecified hearing loss type-seeing audiologist to try another type of hearing aid    6. Essential hypertension with goal blood pressure less than 140/90-controlled, continue with present meds (Lisinopril and Norvasc)    7. Belching-advised to dec intake of gas producing foods, restart daily Prilosec        Follow-up and Dispositions    · Return for previous appt, annual wellness. I have discussed the diagnosis with the patient and the intended plan as seen in the above orders. The patient has received an After-Visit Summary and questions were answered concerning future plans. Medication Side Effects and Warnings were discussed with patient: yes    Patient verbalized understanding of above instructions.     Mandi Marquis MD  Internal Medicine  [facility]

## 2019-11-19 RX ORDER — AMLODIPINE BESYLATE 10 MG/1
10 TABLET ORAL DAILY
Qty: 90 TAB | Refills: 3 | Status: SHIPPED | OUTPATIENT
Start: 2019-11-19 | End: 2020-05-06 | Stop reason: DRUGHIGH

## 2020-01-16 RX ORDER — LISINOPRIL 20 MG/1
20 TABLET ORAL DAILY
Qty: 90 TAB | Refills: 3 | Status: SHIPPED | OUTPATIENT
Start: 2020-01-16 | End: 2021-01-18 | Stop reason: SDUPTHER

## 2020-02-06 ENCOUNTER — OFFICE VISIT (OUTPATIENT)
Dept: FAMILY MEDICINE CLINIC | Age: 85
End: 2020-02-06

## 2020-02-06 ENCOUNTER — HOSPITAL ENCOUNTER (OUTPATIENT)
Dept: LAB | Age: 85
Discharge: HOME | End: 2020-02-06
Payer: MEDICARE

## 2020-02-06 VITALS
OXYGEN SATURATION: 97 % | SYSTOLIC BLOOD PRESSURE: 138 MMHG | HEART RATE: 55 BPM | BODY MASS INDEX: 24.17 KG/M2 | DIASTOLIC BLOOD PRESSURE: 72 MMHG | WEIGHT: 154 LBS | TEMPERATURE: 96.9 F | HEIGHT: 67 IN | RESPIRATION RATE: 18 BRPM

## 2020-02-06 DIAGNOSIS — Z71.89 ADVANCED DIRECTIVES, COUNSELING/DISCUSSION: ICD-10-CM

## 2020-02-06 DIAGNOSIS — I10 ESSENTIAL HYPERTENSION WITH GOAL BLOOD PRESSURE LESS THAN 140/90: ICD-10-CM

## 2020-02-06 DIAGNOSIS — R35.0 BENIGN PROSTATIC HYPERPLASIA WITH URINARY FREQUENCY: ICD-10-CM

## 2020-02-06 DIAGNOSIS — K22.70 BARRETT'S ESOPHAGUS WITHOUT DYSPLASIA: ICD-10-CM

## 2020-02-06 DIAGNOSIS — N40.1 BENIGN PROSTATIC HYPERPLASIA WITH URINARY FREQUENCY: ICD-10-CM

## 2020-02-06 DIAGNOSIS — H91.93 BILATERAL HEARING LOSS, UNSPECIFIED HEARING LOSS TYPE: ICD-10-CM

## 2020-02-06 DIAGNOSIS — G47.62 NOCTURNAL LEG CRAMPS: ICD-10-CM

## 2020-02-06 DIAGNOSIS — Z00.00 MEDICARE ANNUAL WELLNESS VISIT, SUBSEQUENT: Primary | ICD-10-CM

## 2020-02-06 LAB
ALBUMIN SERPL-MCNC: 3.6 G/DL (ref 3.4–5)
ALBUMIN/GLOB SERPL: 1 {RATIO} (ref 0.8–1.7)
ALP SERPL-CCNC: 72 U/L (ref 45–117)
ALT SERPL-CCNC: 21 U/L (ref 16–61)
ANION GAP SERPL CALC-SCNC: 4 MMOL/L (ref 3–18)
AST SERPL-CCNC: 19 U/L (ref 10–38)
BASOPHILS # BLD: 0 K/UL (ref 0–0.1)
BASOPHILS NFR BLD: 0 % (ref 0–2)
BILIRUB SERPL-MCNC: 0.6 MG/DL (ref 0.2–1)
BUN SERPL-MCNC: 28 MG/DL (ref 7–18)
BUN/CREAT SERPL: 26 (ref 12–20)
CALCIUM SERPL-MCNC: 8.7 MG/DL (ref 8.5–10.1)
CHLORIDE SERPL-SCNC: 111 MMOL/L (ref 100–111)
CHOLEST SERPL-MCNC: 166 MG/DL
CO2 SERPL-SCNC: 28 MMOL/L (ref 21–32)
CREAT SERPL-MCNC: 1.09 MG/DL (ref 0.6–1.3)
DIFFERENTIAL METHOD BLD: ABNORMAL
EOSINOPHIL # BLD: 0.2 K/UL (ref 0–0.4)
EOSINOPHIL NFR BLD: 2 % (ref 0–5)
ERYTHROCYTE [DISTWIDTH] IN BLOOD BY AUTOMATED COUNT: 13.9 % (ref 11.6–14.5)
GLOBULIN SER CALC-MCNC: 3.6 G/DL (ref 2–4)
GLUCOSE SERPL-MCNC: 101 MG/DL (ref 74–99)
HCT VFR BLD AUTO: 40 % (ref 36–48)
HDLC SERPL-MCNC: 56 MG/DL (ref 40–60)
HDLC SERPL: 3 {RATIO} (ref 0–5)
HGB BLD-MCNC: 13.4 G/DL (ref 13–16)
LDLC SERPL CALC-MCNC: 98.2 MG/DL (ref 0–100)
LIPID PROFILE,FLP: NORMAL
LYMPHOCYTES # BLD: 1 K/UL (ref 0.9–3.6)
LYMPHOCYTES NFR BLD: 16 % (ref 21–52)
MAGNESIUM SERPL-MCNC: 2.5 MG/DL (ref 1.6–2.6)
MCH RBC QN AUTO: 31.8 PG (ref 24–34)
MCHC RBC AUTO-ENTMCNC: 33.5 G/DL (ref 31–37)
MCV RBC AUTO: 95 FL (ref 74–97)
MONOCYTES # BLD: 0.5 K/UL (ref 0.05–1.2)
MONOCYTES NFR BLD: 8 % (ref 3–10)
NEUTS SEG # BLD: 4.9 K/UL (ref 1.8–8)
NEUTS SEG NFR BLD: 74 % (ref 40–73)
PLATELET # BLD AUTO: 249 K/UL (ref 135–420)
PMV BLD AUTO: 10.6 FL (ref 9.2–11.8)
POTASSIUM SERPL-SCNC: 4.4 MMOL/L (ref 3.5–5.5)
PROT SERPL-MCNC: 7.2 G/DL (ref 6.4–8.2)
RBC # BLD AUTO: 4.21 M/UL (ref 4.7–5.5)
SODIUM SERPL-SCNC: 143 MMOL/L (ref 136–145)
TRIGL SERPL-MCNC: 59 MG/DL (ref ?–150)
VLDLC SERPL CALC-MCNC: 11.8 MG/DL
WBC # BLD AUTO: 6.6 K/UL (ref 4.6–13.2)

## 2020-02-06 PROCEDURE — 85025 COMPLETE CBC W/AUTO DIFF WBC: CPT

## 2020-02-06 PROCEDURE — 80061 LIPID PANEL: CPT

## 2020-02-06 PROCEDURE — 80053 COMPREHEN METABOLIC PANEL: CPT

## 2020-02-06 PROCEDURE — 83735 ASSAY OF MAGNESIUM: CPT

## 2020-02-06 PROCEDURE — 36415 COLL VENOUS BLD VENIPUNCTURE: CPT

## 2020-02-06 NOTE — PROGRESS NOTES
Chief Complaint   Patient presents with    Annual Wellness Visit    Follow Up Chronic Condition     HTN 3 month; patient is fasting       Pt is a 80y.o. year old male who presents for follow up of his chronic medical problems    Meds reviewed: OTC Ibuprofen for muscle pains after playing golf for example; advised it would be better for him to try Tylenol instead yasmani with hx of Garcia's and GERD and his age, [de-identified] inc GI bleed risk      BP Readings from Last 3 Encounters:   02/06/20 138/72   01/27/20 114/75   12/27/19 108/62       Wants to continue with daily ASA    Had laser prostatectomy recently-sxs unchanged as of yet with urinary incontinence; has to wear adult diapers as a choice    Also going to have EGD with Dr Andres Alexandre on 2/21 9/2018-EGD showed Garcia's with no dysplasia  On Prilosec daily      Frequent leg cramps  Has been taking Magnesium because he read this is good for cramps    Fasting for labs today          ROS:    Pt denies: Wt loss, Fever/Chills, HA, Visual changes, Fatigue, Chest pain, SOB, PENA, Abd pain, N/V/D/C, Blood in stool or urine, Edema. Pertinent positive as above in HPI.  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 60 Cap 2    lisinopril (PRINIVIL, ZESTRIL) 20 mg tablet Take 1 Tab by mouth daily. 90 Tab 3    tamsulosin (FLOMAX) 0.4 mg capsule Take 2 Caps by mouth daily. Indications: enlarged prostate with urination problem 90 Cap 3    amLODIPine (NORVASC) 10 mg tablet Take 1 Tab by mouth daily. 90 Tab 3    mirabegron ER (MYRBETRIQ) 50 mg ER tablet Take 1 Tab by mouth daily. 90 Tab 3    Omeprazole delayed release (PRILOSEC D/R) 20 mg tablet Take 1 Tab by mouth daily. 90 Tab 1    ibuprofen 200 mg cap Take 400 mg by mouth two (2) times a day.  aspirin delayed-release 81 mg tablet Take  by mouth daily. Social History     Tobacco Use   Smoking Status Former Smoker    Years: 15.00    Types: Cigarettes    Last attempt to quit: 1965    Years since quittin.1   Smokeless Tobacco Former User       No Known Allergies    Patient Labs were reviewed: yes      Patient Past Records were reviewed:  yes        Objective:     Vitals:    20 1003   BP: 138/72   Pulse: (!) 55   Resp: 18   Temp: 96.9 °F (36.1 °C)   TempSrc: Oral   SpO2: 97%   Weight: 154 lb (69.9 kg)   Height: 5' 7\" (1.702 m)     Body mass index is 24.12 kg/m². Exam:   Appearance: alert, well appearing,  oriented to person, place, and time, acyanotic, in no respiratory distress and well hydrated. HEENT:  NC/AT, pink conj, anicteric sclerae  Neck:  No cervical lymphadenopathy, no JVD, no thyromegaly, no carotid bruit  Heart:  RRR without M/R/G  Lungs:  CTAB, no rhonchi, rales, or wheezes with good air exchange   Abdomen:  Non-tender, pos bowel sounds, no hepatosplenomegaly  Ext:  No C/C/E. Grade 1 bipedal edema    Skin: no rash  Neuro: no lateralizing signs, CNs II-XII intact      Assessment/ Plan:   Diagnoses and all orders for this visit:    1. Medicare annual wellness visit, subsequent-see note below    2.  Essential hypertension with goal blood pressure less than 140/90-controlled, continue with Norvasc and Lisinopril  -     CBC WITH AUTOMATED DIFF; Future  -     METABOLIC PANEL, COMPREHENSIVE; Future  -     LIPID PANEL; Future    3. 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 Nocturnal leg cramps-check Mg level as chronic PPI may deplete mg levels  -     MAGNESIUM; Future    5. Bilateral hearing loss, unspecified hearing loss type-doing well after recent repair of hearing aids    6. Benign prostatic hyperplasia with urinary frequency-on Flomax and Myrbetriq; s/p recent Greenlight prostate procedure; urology following      Follow-up and Dispositions    · Return in about 3 months (around 5/6/2020). I have discussed the diagnosis with the patient and the intended plan as seen in the above orders. The patient has received an After-Visit Summary and questions were answered concerning future plans. Medication Side Effects and Warnings were discussed with patient: yes    Patient verbalized understanding of above instructions. Radha Cornejo MD  Internal Medicine  [facility]    This is the Subsequent Medicare Annual Wellness Exam, performed 12 months or more after the Initial AWV or the last Subsequent AWV    I have reviewed the patient's medical history in detail and updated the computerized patient record.          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 60 Cap 2    lisinopril (PRINIVIL, ZESTRIL) 20 mg tablet Take 1 Tab by mouth daily. 90 Tab 3    tamsulosin (FLOMAX) 0.4 mg capsule Take 2 Caps by mouth daily. Indications: enlarged prostate with urination problem 90 Cap 3    amLODIPine (NORVASC) 10 mg tablet Take 1 Tab by mouth daily. 90 Tab 3    mirabegron ER (MYRBETRIQ) 50 mg ER tablet Take 1 Tab by mouth daily. 90 Tab 3    Omeprazole delayed release (PRILOSEC D/R) 20 mg tablet Take 1 Tab by mouth daily. 90 Tab 1    ibuprofen 200 mg cap Take 400 mg by mouth two (2) times a day.  aspirin delayed-release 81 mg tablet Take  by mouth daily. No Known Allergies    Family History   Family history unknown: Yes     Social History     Tobacco Use    Smoking status: Former Smoker     Years: 15.00     Types: Cigarettes     Last attempt to quit: 1965     Years since quittin.1    Smokeless tobacco: Former User   Substance Use Topics    Alcohol use: Yes     Frequency: Monthly or less     Drinks per session: 1 or 2     Comment: rarely; socially        Depression Risk Factor Screening:     3 most recent PHQ Screens 2020   Little interest or pleasure in doing things Not at all   Feeling down, depressed, irritable, or hopeless Not at all   Total Score PHQ 2 0       Alcohol Risk Factor Screening (MALE > 65):    Do you average more 1 drink per night or more than 7 drinks a week: Yes    In the past three months have you have had more than 4 drinks containing alcohol on one occasion: No      Functional Ability and Level of Safety:   Hearing: The patient wears hearing aids. Had hearing aids checked by another ENT-got it fixed  Activities of Daily Living: The home contains: handrails and grab bars  Patient does total self care    Ambulation: with no difficulty    Fall Risk:  Fall Risk Assessment, last 12 mths 2/6/2020   Able to walk? Yes   Fall in past 12 months? No       Abuse Screen:  Patient is not abused    Cognitive Screening   Has your family/caregiver stated any concerns about your memory: no  Cognitive Screening: Normal - Clock Drawing Test    Patient Care Team   Patient Care Team:  Alycia Lowery MD as PCP - General (Internal Medicine)  Alycia Lowery MD as PCP - [facility] Empaneled Provider  Linda Shaw MD (Urology)  Javier Michelle MD (Ophthalmology)  Davy Enrique MD (Neurology)    Assessment/Plan   Education and counseling provided:  Are appropriate based on today's review and evaluation  End-of-Life planning (with patient's consent)-Adv directive reviewed  Pneumococcal Vaccine-done  Influenza Vaccine-done  Prostate cancer screening tests (PSA, covered annually)-c/o Urology  Cardiovascular screening blood test-fasting lipids today  Screening for glaucoma-advised to schedule eye exam at least Q 2 yrs to check for glaucoma  Diabetes screening test-FBs today    Diagnoses and all orders for this visit:    1. Medicare annual wellness visit, subsequent    2.  Advanced directives, counseling/discussion        Health Maintenance Due   Topic Date Due    DTaP/Tdap/Td series (1 - Tdap) 07/10/1943    Shingrix Vaccine Age 50> (1 of 2) 07/10/1982    GLAUCOMA SCREENING Q2Y -wears eyeglasses/s/p cataract, Lasik 07/10/1997    Pneumococcal 65+ years (1 of 1 - PPSV23) 07/10/1997    Medicare Yearly Exam  02/20/2020   Pt to Obtain copies of vaccines from [address] [place] [address] yearly for wellness visit

## 2020-02-06 NOTE — PATIENT INSTRUCTIONS
Medicare Wellness Visit, Male The best way to live healthy is to have a lifestyle where you eat a well-balanced diet, exercise regularly, limit alcohol use, and quit all forms of tobacco/nicotine, if applicable. Regular preventive services are another way to keep healthy. Preventive services (vaccines, screening tests, monitoring & exams) can help personalize your care plan, which helps you manage your own care. Screening tests can find health problems at the earliest stages, when they are easiest to treat. [facility] follows the current, evidence-based guidelines published by the [facility] ([facility]) when recommending preventive services for our patients. Because we follow these guidelines, sometimes recommendations change over time as research supports it. (For example, a prostate screening blood test is no longer routinely recommended for men with no symptoms). Of course, you and your doctor may decide to screen more often for some diseases, based on your risk and co-morbidities (chronic disease you are already diagnosed with). Preventive services for you include: - Medicare offers their members a free annual wellness visit, which is time for you and your primary care provider to discuss and plan for your preventive service needs. Take advantage of this benefit every year! 
-All adults over age 72 should receive the recommended pneumonia vaccines. Current USPSTF guidelines recommend a series of two vaccines for the best pneumonia protection.  
-All adults should have a flu vaccine yearly and tetanus vaccine every 10 years. 
-All adults age 48 and older should receive the shingles vaccines (series of two vaccines).       
-All adults age 38-68 who are overweight should have a diabetes screening test once every three years.  
-Other screening tests & preventive services for persons with diabetes include: an eye exam to screen for diabetic retinopathy, a kidney function test, a foot exam, and stricter control over your cholesterol.  
-Cardiovascular screening for adults with routine risk involves an electrocardiogram (ECG) at intervals determined by the provider.  
-Colorectal cancer screening should be done for adults age 54-65 with no increased risk factors for colorectal cancer. There are a number of acceptable methods of screening for this type of cancer. Each test has its own benefits and drawbacks. Discuss with your provider what is most appropriate for you during your annual wellness visit. The different tests include: colonoscopy (considered the best screening method), a fecal occult blood test, a fecal DNA test, and sigmoidoscopy. 
-All adults born between [facility] should be screened once for Hepatitis C. 
-An Abdominal Aortic Aneurysm (AAA) Screening is recommended for men age 73-68 who has ever smoked in their lifetime. Here is a list of your current Health Maintenance items (your personalized list of preventive services) with a due date: 
Health Maintenance Due Topic Date Due  
 DTaP/Tdap/Td  (1 - Tdap) 07/10/1943  Shingles Vaccine (1 of 2) 07/10/1982  Glaucoma Screening   07/10/1997  Pneumococcal Vaccine (1 of 1 - PPSV23) 07/10/1997 [address] [place] [address] Annual Well Visit  02/20/2020 Nighttime Leg Cramps: Care Instructions Your Care Instructions Nighttime leg cramps happen when a leg muscle tightens up suddenly. This most often happens in the calf. But cramps in the thigh or foot are also common. Cramps often occur just as you fall asleep or wake up. Leg cramps can be painful. They can last a few seconds to a few minutes. Though they are common, experts don't know exactly what causes them. To treat muscle cramps, you can stretch and massage the muscle. If cramps keep coming back, your doctor may prescribe medicine that relaxes your muscles. Follow-up care is a key part of your treatment and safety. Be sure to make and go to all appointments, and call your doctor if you are having problems. It's also a good idea to know your test results and keep a list of the medicines you take. How can you care for yourself at home? · To stop a leg cramp, sit down and straighten your leg as you bend your foot up toward your knee. It may help to place a rolled towel under the ball of your foot and, while you hold the towel at both ends, gently pull the towel toward you while you keep your knee straight. This stretches the calf muscles. The cramp usually goes away after a few minutes. · Take a warm shower or bath to relax the muscle. Some people find that a heating pad placed on the muscle can also help. Others get relief by rubbing the calf with an ice pack. · Stretch your muscles every day, especially before and after exercise and at bedtime. Regular stretching can relax your muscles and may prevent cramps. · Do not suddenly increase the amount of exercise you get. Increase your exercise a little each week. · If your doctor prescribes medicine, take it exactly as prescribed. Call your doctor if you think you are having a problem with your medicine. · Ask your doctor if you can take an over-the-counter pain medicine, such as acetaminophen (Tylenol), ibuprofen (Advil, Motrin), or naproxen (Aleve). Be safe with medicines. Read and follow all instructions on the label. · Drink plenty of fluids. If you have kidney, heart, or liver disease and have to limit fluids, talk with your doctor before you increase the amount of fluids you drink. When should you call for help? Watch closely for changes in your health, and be sure to contact your doctor if: 
  · You often have muscle cramps that do not go away after home treatment.  
  · Your muscle cramps often wake you up at night.  
  · You do not get better as expected. Where can you learn more? Go to http://sotero-richie.info/. Enter S910 in the search box to learn more about \"Nighttime Leg Cramps: Care Instructions. \" Current as of: March 28, 2019 Content Version: 12.2 © 4600-7179 [employer]. Care instructions adapted under license by [employer] (which disclaims liability or warranty for this information). If you have questions about a medical condition or this instruction, always ask your healthcare professional. [address] any warranty or liability for your use of this information.

## 2020-02-07 NOTE — ACP (ADVANCE CARE PLANNING)
Advance Care Planning (ACP) Provider Conversation Snapshot    Date of ACP Conversation: 02/06/20  Persons included in Conversation:  patient  Length of ACP Conversation in minutes:  <16 minutes (Non-Billable)    Authorized Decision Maker (if patient is incapable of making informed decisions):    This person is:   his daughter Anh Rogers who is a nurse            For Patients with Decision Making Capacity:   Values/Goals: Exploration of values, goals, and preferences if recovery is not expected, even with continued medical treatment in the event of:  Imminent death  Severe, permanent brain injury    Conversation Outcomes / Follow-Up Plan:   Reviewed existing Advance Directive

## 2020-05-06 ENCOUNTER — VIRTUAL VISIT (OUTPATIENT)
Dept: FAMILY MEDICINE CLINIC | Age: 85
End: 2020-05-06

## 2020-05-06 DIAGNOSIS — R00.1 SYMPTOMATIC BRADYCARDIA: Primary | ICD-10-CM

## 2020-05-06 DIAGNOSIS — I10 ESSENTIAL HYPERTENSION WITH GOAL BLOOD PRESSURE LESS THAN 140/90: ICD-10-CM

## 2020-05-06 DIAGNOSIS — N32.81 OAB (OVERACTIVE BLADDER): ICD-10-CM

## 2020-05-06 RX ORDER — AMLODIPINE BESYLATE 5 MG/1
5 TABLET ORAL DAILY
Qty: 90 TAB | Refills: 1 | Status: SHIPPED | OUTPATIENT
Start: 2020-05-06 | End: 2020-11-20 | Stop reason: SDUPTHER

## 2020-05-06 NOTE — PROGRESS NOTES
Danita Soares is a 80 y.o. male who was seen by synchronous (real-time) audio-video technology on 5/6/2020. Consent: Danita Soares, who was seen by synchronous (real-time) audio-video technology, and/or his healthcare decision maker, is aware that this patient-initiated, Telehealth encounter on 5/6/2020 is a billable service, with coverage as determined by his insurance carrier. He is aware that he may receive a bill and has provided verbal consent to proceed: Yes. Assessment & Plan:   Diagnoses and all orders for this visit:    1. Symptomatic bradycardia-will dec the Norvasc dose as this could at times lower the heart rate; follow up with Cardio after the Holter monitor  -     amLODIPine (NORVASC) 5 mg tablet; Take 1 Tab by mouth daily. 2. Essential hypertension with goal blood pressure less than 140/90-advised home monitoring yasmani with the decreased dose of Norvasc, advised to increase Lisinopril to 40 mg if BP is above 140/90 on at least 3 occasions    3. OAB (overactive bladder)-reassured new med Solifenacin does not cause bradycardia      712  Subjective:   Danita Soares is a 80 y.o. male who was seen for Follow Up Chronic Condition    Patient went to [facility] ER on 5/2 after he complained of dizziness to her daughter who is a nurse.  His apple watch read a AR of 60's but when his daughter checked his pulse it was more in the 40-50's so she arranged for him to go to [facility] ER    Er records reviewed: sinus rose with first deg AV block; rate of 57  All labs normal including cardiac enzymes    Was not sent home on new meds, no med changes done    Meds reviewed with patient: not on.was never on HCTZ so we do not know how this med got in his chart  New med from urology: Solifenacin along with Myrbetriq    Patient saw Bob Cespedes on 5/4 (also a virtual visit), Dr Erika Suarez who ordered a Holter to see if he will need a pacemaker  Patient is still waiting on the monitor  No med changes done    He says this this is the first time he felt dizzy    Prior to Admission medications    Medication Sig Start Date End Date Taking? Authorizing Provider   lisinopril (PRINIVIL, ZESTRIL) 20 mg tablet Take 1 Tab by mouth daily. 1/16/20  Yes Nan Hall MD   amLODIPine (NORVASC) 10 mg tablet Take 1 Tab by mouth daily. 11/19/19  Yes Nan Hall MD   mirabegron ER (MYRBETRIQ) 50 mg ER tablet Take 1 Tab by mouth daily. 8/8/19  Yes Mathew Brody MD   Omeprazole delayed release (PRILOSEC D/R) 20 mg tablet Take 1 Tab by mouth daily. 1/29/18  Yes Nan Hall MD   ibuprofen 200 mg cap Take 400 mg by mouth two (2) times a day. Yes Provider, Historical   aspirin delayed-release 81 mg tablet Take  by mouth daily. Yes Provider, Historical   hydroCHLOROthiazide (HYDRODIURIL) 25 mg tablet Take 25 mg by mouth daily. 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    General: alert, cooperative, no distress   Mental  status: normal mood, behavior, speech, dress, motor activity, and thought processes, able to follow commands   HENT: NCAT   Neck: no visualized mass   Resp: no respiratory distress   Neuro: no gross deficits   Skin: no discoloration or lesions of concern on visible areas   Psychiatric: normal affect, consistent with stated mood, no evidence of hallucinations     Additional exam findings: appears well      We discussed the expected course, resolution and complications of the diagnosis(es) in detail. Medication risks, benefits, costs, interactions, and alternatives were discussed as indicated. I advised him to contact the office if his condition worsens, changes or fails to improve as anticipated. He expressed understanding with the diagnosis(es) and plan. Rashel Blas is a 80 y.o. male who was evaluated by a video visit encounter for concerns as above. Patient identification was verified prior to start of the visit. A caregiver was present when appropriate. Due to this being a TeleHealth encounter (During [facility]-22 public health emergency), evaluation of the following organ systems was limited: Vitals/Constitutional/EENT/Resp/CV/GI//MS/Neuro/Skin/Heme-Lymph-Imm. Pursuant to the emergency declaration under the [facility], 1135 waiver authority and the [employer] and [employer] General Act, this Virtual  Visit was conducted, with patient's (and/or legal guardian's) consent, to reduce the patient's risk of exposure to COVID-19 and provide necessary medical care. Services were provided through a video synchronous discussion virtually to substitute for in-person clinic visit. Patient and provider were located at their individual homes.       Leobardo Christina MD

## 2020-05-06 NOTE — PATIENT INSTRUCTIONS
Bradycardia: Care Instructions Your Care Instructions Bradycardia is a slow heart rate. A slow heart rate can be normal and healthy. Or it could be a sign of a problem with the heart's electrical system. If your heart beats too slowly, it may not supply your body with enough blood. This can make you weak or dizzy. Or it may make you pass out. Bradycardia can be caused by many things. This includes medicine, certain medical conditions, and changes in the heart that are the result of aging. How bradycardia is treated depends on what is causing it. Treatments include treating another health problem, changing a medicine, and getting a pacemaker. Treatment also depends on the symptoms. If bradycardia doesn't cause symptoms, it may not be treated. You and your doctor can decide what treatment is right for you. Follow-up care is a key part of your treatment and safety. Be sure to make and go to all appointments, and call your doctor if you are having problems. It's also a good idea to know your test results and keep a list of the medicines you take. How can you care for yourself at home? · Take your medicines exactly as prescribed. Call your doctor if you think you are having a problem with your medicine. If your bradycardia is caused by another disease, your doctor will try to treat the disease. If it is caused by heart medicines, he or she will adjust your medicines. · Make lifestyle changes to improve your heart health. ? Eat a heart-healthy diet that includes vegetables, fruits, nuts, beans, lean meat, fish, and whole grains. Limit alcohol, sodium, and sugar. ? Get regular exercise. Try for 30 minutes on most days of the week. If you do not have other heart problems, you likely do not have limits on the type or level of activity that you can do. You may want to walk, swim, bike, or do other activities. Ask your doctor what level of exercise is safe for you. ? Stay at a healthy weight. Lose weight if you need to. 
? Do not smoke. If you need help quitting, talk to your doctor about stop-smoking programs and medicines. These can increase your chances of quitting for good. ? Manage other health problems such as high blood pressure, high cholesterol, and diabetes. · If you get a pacemaker, you will get information about it. When should you call for help? Call 911 anytime you think you may need emergency care. For example, call if: 
  · You have symptoms of sudden heart failure. These may include: 
? Severe trouble breathing. ? A fast or irregular heartbeat. ? Coughing up pink, foamy mucus. ? You passed out.  
  · You have symptoms of a stroke. These may include: 
? Sudden numbness, tingling, weakness, or loss of movement in your face, arm, or leg, especially on only one side of your body. ? Sudden vision changes. ? Sudden trouble speaking. ? Sudden confusion or trouble understanding simple statements. ? Sudden problems with walking or balance. ? A sudden, severe headache that is different from past headaches.  
 Call your doctor now or seek immediate medical care if: 
  · You have new or changed symptoms of heart failure, such as: ? New or increased shortness of breath. ? New or worse swelling in your legs, ankles, or feet. ? Sudden weight gain, such as more than 2 to 3 pounds in a day or 5 pounds in a week. (Your doctor may suggest a different range of weight gain.) ? Feeling dizzy or lightheaded or like you may faint. ? Feeling so tired or weak that you cannot do your usual activities. ? Not sleeping well. Shortness of breath wakes you at night. You need extra pillows to prop yourself up to breathe easier.  
 Watch closely for changes in your health, and be sure to contact your doctor if: 
  · You do not get better as expected. Where can you learn more? Go to http://sotero-richie.info/ Enter Z209 in the search box to learn more about \"Bradycardia: Care Instructions. \" Current as of: December 15, 2019Content Version: 12.4 © 4415-6247 Healthwise, Incorporated. Care instructions adapted under license by [employer] (which disclaims liability or warranty for this information). If you have questions about a medical condition or this instruction, always ask your healthcare professional. [place] [address] any warranty or liability for your use of this information.

## 2020-05-06 NOTE — PROGRESS NOTES
1. Have you been to the ER, urgent care clinic since your last visit? Hospitalized since your last visit? Yes [facility]     2. Have you seen or consulted any other health care providers outside of the [address] [place] [address] since your last visit? Include any pap smears or colon screening.  No

## 2020-08-06 ENCOUNTER — VIRTUAL VISIT (OUTPATIENT)
Dept: FAMILY MEDICINE CLINIC | Age: 85
End: 2020-08-06

## 2020-08-06 DIAGNOSIS — R97.20 ELEVATED PSA: ICD-10-CM

## 2020-08-06 DIAGNOSIS — I10 ESSENTIAL HYPERTENSION WITH GOAL BLOOD PRESSURE LESS THAN 140/90: Primary | ICD-10-CM

## 2020-08-06 DIAGNOSIS — R00.1 SYMPTOMATIC BRADYCARDIA: ICD-10-CM

## 2020-08-06 NOTE — PROGRESS NOTES
Chief Complaint   Patient presents with    Follow Up Chronic Condition     3 month; HTN, GERD       1. Have you been to the ER, urgent care clinic since your last visit? Hospitalized since your last visit? No    2. Have you seen or consulted any other health care providers outside of the [address] [place] [address] since your last visit? Include any pap smears or colon screening.  Yes Where: Dr Miller-Urology visit

## 2020-08-06 NOTE — PROGRESS NOTES
Brennan West is a 80 y.o. male who was seen by synchronous (real-time) audio-video technology on 8/6/2020 for Follow Up Chronic Condition (3 month; HTN, GERD)        Assessment & Plan:   Diagnoses and all orders for this visit:    1. Essential hypertension with goal blood pressure less than 140/90-continue with home monitoring; ok to continue on lower dose of Norvasc    2. Symptomatic bradycardia-resolved; Norvasc dose lowered since; per Cardio no need for pacemaker at this time    3. Elevated PSA-reassured levels are coming down; continue to follow up with Dr Richard Terry        Follow-up and Dispositions    · Return in about 3 months (around 11/6/2020) for follow up. 712  Subjective:     Health Maintenance Due   Topic Date Due    DTaP/Tdap/Td series (1 - Tdap) 07/10/1953    Shingrix Vaccine Age 50> (1 of 2) 07/10/1982    Pneumococcal 65+ years (1 of 1 - PPSV23) 07/10/1997    Influenza Age 5 to Adult  08/01/2020     Saw Cardio  HR in the low 60's to mid 70's now without any intervention    BP ok with lower dose of Norvasc    Saw Dr Richard Terry a few days ago-elev PSA; repeated but patient does not know the results yet  Lab Results   Component Value Date/Time    Prostate Specific Ag 8.44 (H) 07/31/2020 04:20 PM    Prostate Specific Ag 11.33 (H) 01/02/2020 04:14 PM    Prostate Specific Ag 10.3 (H) 12/12/2017 10:23 AM    Prostate Specific Ag 10.57 (H) 11/01/2016 03:27 PM    Prostate Specific Ag 5.3 (A) 04/15/2015    Prostate Specific Ag 5.0 (A) 10/02/2014       Prior to Admission medications    Medication Sig Start Date End Date Taking? Authorizing Provider   amLODIPine (NORVASC) 5 mg tablet Take 1 Tab by mouth daily. 5/6/20  Yes Nan Hall MD   lisinopril (PRINIVIL, ZESTRIL) 20 mg tablet Take 1 Tab by mouth daily. 1/16/20  Yes Nan Hall MD   mirabegron ER (MYRBETRIQ) 50 mg ER tablet Take 1 Tab by mouth daily.  8/8/19  Yes Zach Chen MD   Omeprazole delayed release (PRILOSEC D/R) 20 mg tablet Take 1 Tab by mouth daily. 1/29/18  Yes Nan Hall MD   ibuprofen 200 mg cap Take 400 mg by mouth two (2) times a day. Yes Provider, Historical   aspirin delayed-release 81 mg tablet Take  by mouth daily. 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 Pertinent positive as above in HPI. All others were negative  Objective:   No flowsheet data found. General: alert, cooperative, no distress   Mental  status: normal mood, behavior, speech, dress, motor activity, and thought processes, able to follow commands   HENT: NCAT   Neck: no visualized mass   Resp: no respiratory distress   Neuro: no gross deficits   Skin: no discoloration or lesions of concern on visible areas   Psychiatric: normal affect, consistent with stated mood, no evidence of hallucinations     Additional exam findings:none       We discussed the expected course, resolution and complications of the diagnosis(es) in detail. Medication risks, benefits, costs, interactions, and alternatives were discussed as indicated. I advised him to contact the office if his condition worsens, changes or fails to improve as anticipated. He expressed understanding with the diagnosis(es) and plan. Joana Arias, who was evaluated through a patient-initiated, synchronous (real-time) audio-video encounter, and/or his healthcare decision maker, is aware that it is a billable service, with coverage as determined by his insurance carrier. He provided verbal consent to proceed: Yes, and patient identification was verified. It was conducted pursuant to the emergency declaration under the [address] [place] [address] and the [employer] General Act. A caregiver was present when appropriate. Ability to conduct physical exam was limited. I was st the office. The patient was at home.       Nany Harley MD

## 2020-08-07 NOTE — PATIENT INSTRUCTIONS
Prostate-Specific Antigen (PSA) Test: About This Test 
What is it? A prostate-specific antigen (PSA) test measures the amount of PSA in your blood. PSA is released by a man's prostate gland into his blood. A high PSA level may mean that you have an enlargement, infection, or cancer of the prostate. Why is this test done? You may have this test to: · Check for prostate cancer. · Watch prostate cancer and see if treatment is working. How do you prepare for the test? 
Do not ejaculate during the 2 days before your PSA blood test, either during sex or masturbation. Talk to your doctor about any concerns you have regarding the need for the test, its risks, how it will be done, or what the results will mean. How is the test done? A health professional uses a needle to take a blood sample, usually from the arm. What happens after the test? 
· You will probably be able to go home right away. It depends on the reason for the test. 
· You can go back to your usual activities right away. Follow-up care is a key part of your treatment and safety. Be sure to make and go to all appointments, and call your doctor if you are having problems. It's also a good idea to keep a list of the medicines you take. Ask your doctor when you can expect to have your test results. Where can you learn more? Go to http://sotero-richie.info/ Enter F850 in the search box to learn more about \"Prostate-Specific Antigen (PSA) Test: About This Test.\" Current as of: August 22, 2019               Content Version: 12.5 © 8172-1103 Healthwise, Incorporated. Care instructions adapted under license by [employer] (which disclaims liability or warranty for this information). If you have questions about a medical condition or this instruction, always ask your healthcare professional. [address] any warranty or liability for your use of this information.

## 2020-09-22 ENCOUNTER — CLINICAL SUPPORT (OUTPATIENT)
Dept: FAMILY MEDICINE CLINIC | Age: 85
End: 2020-09-22
Payer: MEDICARE

## 2020-09-22 DIAGNOSIS — Z23 NEEDS FLU SHOT: ICD-10-CM

## 2020-09-22 DIAGNOSIS — Z23 ENCOUNTER FOR IMMUNIZATION: ICD-10-CM

## 2020-09-22 PROCEDURE — 90653 IIV ADJUVANT VACCINE IM: CPT | Performed by: INTERNAL MEDICINE

## 2020-09-22 PROCEDURE — 90471 IMMUNIZATION ADMIN: CPT | Performed by: INTERNAL MEDICINE

## 2020-09-22 NOTE — PATIENT INSTRUCTIONS
Vaccine Information Statement    Influenza (Flu) Vaccine (Inactivated or Recombinant): What You Need to Know    Many Vaccine Information Statements are available in Korean and other languages. See www.immunize.org/vis  Hojas de información sobre vacunas están disponibles en español y en muchos otros idiomas. Visite www.immunize.org/vis    1. Why get vaccinated? Influenza vaccine can prevent influenza (flu). Flu is a contagious disease that spreads around the [facility] every year, usually between October and May. Anyone can get the flu, but it is more dangerous for some people. Infants and young children, people 72years of age and older, pregnant women, and people with certain health conditions or a weakened immune system are at greatest risk of flu complications. Pneumonia, bronchitis, sinus infections and ear infections are examples of flu-related complications. If you have a medical condition, such as heart disease, cancer or diabetes, flu can make it worse. Flu can cause fever and chills, sore throat, muscle aches, fatigue, cough, headache, and runny or stuffy nose. Some people may have vomiting and diarrhea, though this is more common in children than adults. Each year thousands of people in the [facility] die from flu, and many more are hospitalized. Flu vaccine prevents millions of illnesses and flu-related visits to the doctor each year. 2. Influenza vaccines     CDC recommends everyone 10months of age and older get vaccinated every flu season. Children 6 months through 6years of age may need 2 doses during a single flu season. Everyone else needs only 1 dose each flu season. It takes about 2 weeks for protection to develop after vaccination. There are many flu viruses, and they are always changing. Each year a new flu vaccine is made to protect against three or four viruses that are likely to cause disease in the upcoming flu season.  Even when the vaccine doesnt exactly match these viruses, it may still provide some protection. Influenza vaccine does not cause flu. Influenza vaccine may be given at the same time as other vaccines. 3. Talk with your health care provider    Tell your vaccine provider if the person getting the vaccine:   Has had an allergic reaction after a previous dose of influenza vaccine, or has any severe, life-threatening allergies.  Has ever had Guillain-Barré Syndrome (also called GBS). In some cases, your health care provider may decide to postpone influenza vaccination to a future visit. People with minor illnesses, such as a cold, may be vaccinated. People who are moderately or severely ill should usually wait until they recover before getting influenza vaccine. Your health care provider can give you more information. 4. Risks of a reaction     Soreness, redness, and swelling where shot is given, fever, muscle aches, and headache can happen after influenza vaccine.  There may be a very small increased risk of Guillain-Barré Syndrome (GBS) after inactivated influenza vaccine (the flu shot). Denise Tan children who get the flu shot along with pneumococcal vaccine (PCV13), and/or DTaP vaccine at the same time might be slightly more likely to have a seizure caused by fever. Tell your health care provider if a child who is getting flu vaccine has ever had a seizure. People sometimes faint after medical procedures, including vaccination. Tell your provider if you feel dizzy or have vision changes or ringing in the ears. As with any medicine, there is a very remote chance of a vaccine causing a severe allergic reaction, other serious injury, or death. 5. What if there is a serious problem? An allergic reaction could occur after the vaccinated person leaves the clinic.  If you see signs of a severe allergic reaction (hives, swelling of the face and throat, difficulty breathing, a fast heartbeat, dizziness, or weakness), call 9-1-1 and get the person to the nearest hospital.    For other signs that concern you, call your health care provider. Adverse reactions should be reported to the Vaccine Adverse Event Reporting System (VAERS). Your health care provider will usually file this report, or you can do it yourself. Visit the VAERS website at www.vaers. [facility].gov or call 8-304.699.1178. VAERS is only for reporting reactions, and VAERS staff do not give medical advice. 6. The National Vaccine Injury Compensation Program    The [facility] Vaccine Injury Compensation Program (VICP) is a federal program that was created to compensate people who may have been injured by certain vaccines. Visit the VICP website at www.hrsa.gov/vaccinecompensation or call 5-606.703.4438 to learn about the program and about filing a claim. There is a time limit to file a claim for compensation. 7. How can I learn more?  Ask your health care provider.  Call your local or state health department.  Contact the Centers for Disease Control and Prevention (CDC):  - Call 6-836.154.7077 (1-800-CDC-INFO) or  - Visit CDCs influenza website at www.cdc.gov/flu    Vaccine Information Statement (Interim)  Inactivated Influenza Vaccine   8/15/2019  42 FRANK Sim 512TZ-00   Department of Health and Human Services  Centers for Disease Control and Prevention    Office Use Only

## 2020-09-22 NOTE — PROGRESS NOTES
Given @ 4138    Patient presents for influenza vaccine injection. Consent given to patient and signed. Area prepped and injection given in left deltoid. No signs nor symptoms of adverse reaction noted. Patient tolerated injection well.

## 2020-11-19 ENCOUNTER — OFFICE VISIT (OUTPATIENT)
Dept: FAMILY MEDICINE CLINIC | Age: 85
End: 2020-11-19
Payer: MEDICARE

## 2020-11-19 VITALS
RESPIRATION RATE: 15 BRPM | SYSTOLIC BLOOD PRESSURE: 130 MMHG | BODY MASS INDEX: 24.65 KG/M2 | HEIGHT: 66 IN | OXYGEN SATURATION: 97 % | HEART RATE: 50 BPM | DIASTOLIC BLOOD PRESSURE: 80 MMHG | WEIGHT: 153.4 LBS

## 2020-11-19 DIAGNOSIS — K22.70 BARRETT'S ESOPHAGUS WITHOUT DYSPLASIA: ICD-10-CM

## 2020-11-19 DIAGNOSIS — M79.89 SWELLING OF MIDDLE FINGER: ICD-10-CM

## 2020-11-19 DIAGNOSIS — I10 ESSENTIAL HYPERTENSION WITH GOAL BLOOD PRESSURE LESS THAN 140/90: Primary | ICD-10-CM

## 2020-11-19 DIAGNOSIS — N32.81 OAB (OVERACTIVE BLADDER): ICD-10-CM

## 2020-11-19 DIAGNOSIS — R00.1 SYMPTOMATIC BRADYCARDIA: ICD-10-CM

## 2020-11-19 PROCEDURE — 1101F PT FALLS ASSESS-DOCD LE1/YR: CPT | Performed by: INTERNAL MEDICINE

## 2020-11-19 PROCEDURE — G0463 HOSPITAL OUTPT CLINIC VISIT: HCPCS | Performed by: INTERNAL MEDICINE

## 2020-11-19 PROCEDURE — G8420 CALC BMI NORM PARAMETERS: HCPCS | Performed by: INTERNAL MEDICINE

## 2020-11-19 PROCEDURE — G8427 DOCREV CUR MEDS BY ELIG CLIN: HCPCS | Performed by: INTERNAL MEDICINE

## 2020-11-19 PROCEDURE — G8536 NO DOC ELDER MAL SCRN: HCPCS | Performed by: INTERNAL MEDICINE

## 2020-11-19 PROCEDURE — G8432 DEP SCR NOT DOC, RNG: HCPCS | Performed by: INTERNAL MEDICINE

## 2020-11-19 PROCEDURE — 99214 OFFICE O/P EST MOD 30 MIN: CPT | Performed by: INTERNAL MEDICINE

## 2020-11-19 NOTE — PROGRESS NOTES
Chief Complaint   Patient presents with    Follow Up Chronic Condition     3 month; patient not fasting    Joint Pain     middle fingers       1. Have you been to the ER, urgent care clinic since your last visit? Hospitalized since your last visit? No    2. Have you seen or consulted any other health care providers outside of the [address] [place] [address] since your last visit? Include any pap smears or colon screening.  No    Health Maintenance Due   Topic Date Due    DTaP/Tdap/Td series (1 - Tdap) 07/10/1953    Shingrix Vaccine Age 50> (1 of 2) 07/10/1982    Pneumococcal 65+ years (1 of 1 - PPSV23) 07/10/1997

## 2020-11-19 NOTE — PROGRESS NOTES
Chief Complaint   Patient presents with    Follow Up Chronic Condition     3 month; patient not fasting    Joint Pain     middle fingers       Pt is a 80y.o. year old male who presents for follow up of his chronic medical problems    Health Maintenance Due   Topic Date Due    DTaP/Tdap/Td series (1 - Tdap) 07/[birth year]/1953    Shingrix Vaccine Age 50> (1 of 2) 07/10/1982    Pneumococcal 65+ years (1 of 1 - PPSV23) 07/10/1997     Satisfied with health at the moment he says    BP Readings from Last 3 Encounters:   11/19/20 130/80   05/02/20 127/79   02/21/20 119/74   recheck BP-better  Home BPs normal  Normal at Cardio visit recently    OAB-incontinence is better/freq is decreased  On both Myrbetriq and Vesicare    GERD-saw GI, found nothing but still belches a lot-?swallows a lot of air he was told      Wt Readings from Last 3 Encounters:   11/19/20 153 lb 6.4 oz (69.6 kg)   07/31/20 145 lb (65.8 kg)   05/02/20 145 lb (65.8 kg)     Still playing golf but playing less number of days      Left handed  Left middle finger swelling and LOM is getting worse but both hands have swollen fingers      ROS:    Pt denies: Wt loss, Fever/Chills, HA, Visual changes, Fatigue, Chest pain, SOB, PENA, Abd pain, N/V/D/C, Blood in stool or urine, Edema. Pertinent positive as above in HPI.  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 90 Tab 3    solifenacin (VESICARE) 5 mg tablet take 1 tablet by mouth once daily 90 Tab 2    amLODIPine (NORVASC) 5 mg tablet Take 1 Tab by mouth daily. 90 Tab 1    lisinopril (PRINIVIL, ZESTRIL) 20 mg tablet Take 1 Tab by mouth daily. 90 Tab 3    Omeprazole delayed release (PRILOSEC D/R) 20 mg tablet Take 1 Tab by mouth daily. 90 Tab 1    ibuprofen 200 mg cap Take 400 mg by mouth two (2) times a day.  aspirin delayed-release 81 mg tablet Take  by mouth daily. Social History     Tobacco Use   Smoking Status Former Smoker    Years: 15.00    Types: Cigarettes    Last attempt to quit: 1965    Years since quittin.9   Smokeless Tobacco Former User       No Known Allergies    Patient Labs were reviewed: yes      Patient Past Records were reviewed:  yes        Objective:     Vitals:    20 1010 20 1042   BP: (!) 146/80 130/80   Pulse: (!) 50    Resp: 15    SpO2: 97%    Weight: 153 lb 6.4 oz (69.6 kg)    Height: 5' 6\" (1.676 m)      Body mass index is 24.76 kg/m². Exam:   Appearance: alert, well appearing,  oriented to person, place, and time, acyanotic, in no respiratory distress and well hydrated. HEENT:  NC/AT, pink conj, anicteric sclerae, hearing aids bilaterally  Neck:  No cervical lymphadenopathy, no JVD, no thyromegaly, no carotid bruit  Heart:  RRR without M/R/G  Lungs:  CTAB, no rhonchi, rales, or wheezes with good air exchange   Abdomen:  Non-tender, pos bowel sounds, no hepatosplenomegaly  Ext:  No C/C/E, swelling on the DIP of both middle fingers; no swelling on the MCP joints or the wrist   Skin: no rash  Neuro: no lateralizing signs, CNs II-XII intact      Assessment/ Plan:   Diagnoses and all orders for this visit:    1.  Essential hypertension with goal blood pressure less than 140/90-continue with present meds and home monitoring    2. Swelling of middle finger -likely OA, given exercises to do and he will consider referral to hand surgery if sxs worsen  -     XR 3RD FINGER LT MIN 2 V; Future  -     XR 3RD FINGER RT MIN 2 V; Future    3. OAB (overactive bladder) -Urology following; sxs better on 2 meds Vesicare and Myrbetriq    4. Symptomatic bradycardia-Cardio following; ?cardiac amyloid on echo but no sxs of CHF which we discussed today what to watch out for    5. Garcia's esophagus without dysplasia -continue with daily PPI, GI following him for this as well and had recent EGd for surveillance        Follow-up and Dispositions    · Return in about 3 months (around 2/19/2021) for follow up. I have discussed the diagnosis with the patient and the intended plan as seen in the above orders. The patient has received an After-Visit Summary and questions were answered concerning future plans. Medication Side Effects and Warnings were discussed with patient: yes    Patient verbalized understanding of above instructions.     Taiwo Caicedo MD  Internal Medicine  [facility]

## 2020-11-19 NOTE — PATIENT INSTRUCTIONS
Hand Arthritis: Exercises Introduction Here are some examples of exercises for you to try. The exercises may be suggested for a condition or for rehabilitation. Start each exercise slowly. Ease off the exercises if you start to have pain. You will be told when to start these exercises and which ones will work best for you. How to do the exercises Tendon glides 1. In this exercise, the steps follow one another to a make a continuous movement. 2. With your affected hand, point your fingers and thumb straight up. Your wrist should be relaxed, following the line of your fingers and thumb. 3. Curl your fingers so that the top two joints in them are bent, and your fingers wrap down. Your fingertips should touch or be near the base of your fingers. Your fingers will look like a hook. 4. Make a fist by bending your knuckles. Your thumb can gently rest against your index (pointing) finger. 5. Unwind your fingers slightly so that your fingertips can touch the base of your palm. Your thumb can rest against your index finger. 6. Move back to your starting position, with your fingers and thumb pointing up. 7. Repeat the series of motions 8 to 12 times. 8. Switch hands and repeat steps 1 through 6, even if only one hand is sore. Intrinsic flexion 1. Rest your affected hand on a table and bend the large joints where your fingers connect to your hand. Keep your thumb and the other joints in your fingers straight. 2. Slowly straighten your fingers. Your wrist should be relaxed, following the line of your fingers and thumb. 3. Move back to your starting position, with your hand bent. 4. Repeat 8 to 12 times. 5. Switch hands and repeat steps 1 through 4, even if only one hand is sore. Finger extension 1. Place your affected hand flat on a table. 2. Lift and then lower one finger at a time off the table. 3. Repeat 8 to 12 times.  
4. Switch hands and repeat steps 1 through 3, even if only one hand is sore.   
MP extension 1. Place your good hand on a table, palm up. Put your affected hand on top of your good hand with your fingers wrapped around the thumb of your good hand like you are making a fist. 
2. Slowly uncurl the joints of your affected hand where your fingers connect to your hand so that only the top two joints of your fingers are bent. Your fingers will look like a hook. 3. Move back to your starting position, with your fingers wrapped around your good thumb. 4. Repeat 8 to 12 times. 5. Switch hands and repeat steps 1 through 4, even if only one hand is sore. PIP extension (with MP extension) 1. Place your good hand on a table, palm up. Put your affected hand on top of your good hand, palm up. 2. Use the thumb and fingers of your good hand to grasp below the middle joint of one finger of your affected hand. 3. Straighten the last two joints of that finger. 4. Repeat 8 to 12 times. 5. Repeat steps 1 through 4 with each finger. 6. Switch hands and repeat steps 1 through 5, even if only one hand is sore. DIP flexion 1. With your good hand, grasp one finger of your affected hand. Your thumb will be on the top side of your finger just below the joint that is closest to your fingernail. 2. Slowly bend your affected finger only at the joint closest to your fingernail. 3. Repeat 8 to 12 times. 4. Repeat steps 1 through 3 with each finger. 5. Switch hands and repeat steps 1 through 4, even if only one hand is sore. Follow-up care is a key part of your treatment and safety. Be sure to make and go to all appointments, and call your doctor if you are having problems. It's also a good idea to know your test results and keep a list of the medicines you take. Where can you learn more? Go to http://www.gray.com/ Enter T716 in the search box to learn more about \"Hand Arthritis: Exercises. \" Current as of: March 2, 2020               Content Version: 12.6 © 1031-2102 [employer]. Care instructions adapted under license by [employer] (which disclaims liability or warranty for this information). If you have questions about a medical condition or this instruction, always ask your healthcare professional. [address] any warranty or liability for your use of this information.

## 2020-11-20 DIAGNOSIS — R00.1 SYMPTOMATIC BRADYCARDIA: ICD-10-CM

## 2020-11-22 RX ORDER — AMLODIPINE BESYLATE 5 MG/1
5 TABLET ORAL DAILY
Qty: 90 TAB | Refills: 1 | Status: SHIPPED | OUTPATIENT
Start: 2020-11-22

## 2020-11-24 DIAGNOSIS — M19.042 OSTEOARTHRITIS OF LEFT MIDDLE FINGER: Primary | ICD-10-CM

## 2021-01-07 NOTE — TELEPHONE ENCOUNTER
----- Message from Garry Bridges MD sent at 12/13/2017  6:33 AM EST -----  pls let patient know magnesium level is normal, PSA is stable, Vit d is slightly low so OTC Vit D 400IU once a day  Cholesterol looks great, other labs normal Stelara Pregnancy And Lactation Text: This medication is Pregnancy Category B and is considered safe during pregnancy. It is unknown if this medication is excreted in breast milk.

## 2021-01-18 RX ORDER — LISINOPRIL 20 MG/1
20 TABLET ORAL DAILY
Qty: 90 TAB | Refills: 3 | Status: SHIPPED | OUTPATIENT
Start: 2021-01-18

## 2021-02-02 PROBLEM — R00.1 SYMPTOMATIC SINUS BRADYCARDIA: Status: ACTIVE | Noted: 2020-05-04

## 2021-02-18 ENCOUNTER — TELEPHONE (OUTPATIENT)
Dept: FAMILY MEDICINE CLINIC | Age: 86
End: 2021-02-18

## 2021-02-18 NOTE — TELEPHONE ENCOUNTER
Patient is requesting a callback regarding the appointment he did not keep. Does not want to reschedule.   Call him back [phone]

## 2021-02-25 ENCOUNTER — OFFICE VISIT (OUTPATIENT)
Dept: FAMILY MEDICINE CLINIC | Age: 86
End: 2021-02-25
Payer: MEDICARE

## 2021-02-25 VITALS
TEMPERATURE: 98.7 F | OXYGEN SATURATION: 97 % | DIASTOLIC BLOOD PRESSURE: 84 MMHG | SYSTOLIC BLOOD PRESSURE: 136 MMHG | WEIGHT: 151 LBS | RESPIRATION RATE: 15 BRPM | HEIGHT: 66 IN | HEART RATE: 50 BPM | BODY MASS INDEX: 24.27 KG/M2

## 2021-02-25 DIAGNOSIS — R09.82 POST-NASAL DRIP: ICD-10-CM

## 2021-02-25 DIAGNOSIS — K22.70 BARRETT'S ESOPHAGUS WITHOUT DYSPLASIA: ICD-10-CM

## 2021-02-25 DIAGNOSIS — N32.81 OAB (OVERACTIVE BLADDER): ICD-10-CM

## 2021-02-25 DIAGNOSIS — H61.91 SKIN LESION OF RIGHT EXTERNAL EAR: ICD-10-CM

## 2021-02-25 DIAGNOSIS — Z00.00 MEDICARE ANNUAL WELLNESS VISIT, SUBSEQUENT: Primary | ICD-10-CM

## 2021-02-25 DIAGNOSIS — Z71.89 ADVANCED DIRECTIVES, COUNSELING/DISCUSSION: ICD-10-CM

## 2021-02-25 DIAGNOSIS — M65.332 TRIGGER MIDDLE FINGER OF LEFT HAND: ICD-10-CM

## 2021-02-25 DIAGNOSIS — R00.1 BRADYCARDIA: ICD-10-CM

## 2021-02-25 DIAGNOSIS — I10 ESSENTIAL HYPERTENSION WITH GOAL BLOOD PRESSURE LESS THAN 140/90: ICD-10-CM

## 2021-02-25 DIAGNOSIS — R97.20 ELEVATED PSA: ICD-10-CM

## 2021-02-25 PROCEDURE — 99214 OFFICE O/P EST MOD 30 MIN: CPT | Performed by: INTERNAL MEDICINE

## 2021-02-25 PROCEDURE — G8510 SCR DEP NEG, NO PLAN REQD: HCPCS | Performed by: INTERNAL MEDICINE

## 2021-02-25 PROCEDURE — G0439 PPPS, SUBSEQ VISIT: HCPCS | Performed by: INTERNAL MEDICINE

## 2021-02-25 PROCEDURE — G0463 HOSPITAL OUTPT CLINIC VISIT: HCPCS | Performed by: INTERNAL MEDICINE

## 2021-02-25 PROCEDURE — 1101F PT FALLS ASSESS-DOCD LE1/YR: CPT | Performed by: INTERNAL MEDICINE

## 2021-02-25 PROCEDURE — G8427 DOCREV CUR MEDS BY ELIG CLIN: HCPCS | Performed by: INTERNAL MEDICINE

## 2021-02-25 PROCEDURE — G8420 CALC BMI NORM PARAMETERS: HCPCS | Performed by: INTERNAL MEDICINE

## 2021-02-25 PROCEDURE — G8536 NO DOC ELDER MAL SCRN: HCPCS | Performed by: INTERNAL MEDICINE

## 2021-02-25 RX ORDER — FLUTICASONE PROPIONATE 50 MCG
1 SPRAY, SUSPENSION (ML) NASAL DAILY
Qty: 1 BOTTLE | Refills: 3 | Status: SHIPPED | OUTPATIENT
Start: 2021-02-25

## 2021-02-25 NOTE — PROGRESS NOTES
Chief Complaint   Patient presents with   Naomi Her Annual Wellness Visit       1. Have you been to the ER, urgent care clinic since your last visit? Hospitalized since your last visit? No    2. Have you seen or consulted any other health care providers outside of the [address] [place] [address] since your last visit? Include any pap smears or colon screening.  Dr. Monica Rome Shot in hands    Health Maintenance Due   Topic    DTaP/Tdap/Td series (1 - Tdap)    Shingrix Vaccine Age 50> (1 of 2)    Pneumococcal 65+ years (1 of 1 - PPSV23)    Medicare Yearly Exam

## 2021-02-25 NOTE — ACP (ADVANCE CARE PLANNING)
Advance Care Planning     Advance Care Planning (ACP) Physician/NP/PA Conversation      Date of Conversation: 2/25/2021  Conducted with: Patient with Decision Making Capacity    Healthcare Decision Maker:     Primary Decision Maker: Dale Rosa - Daughter - [phone]    Care Preferences:    Hospitalization: \"If your health worsens and it becomes clear that your chance of recovery is unlikely, what would be your preference regarding hospitalization? \"  The patient would prefer comfort-focused treatment without hospitalization. Ventilation: \"If you were unable to breathe on your own and your chance of recovery was unlikely, what would be your preference about the use of a ventilator (breathing machine) if it was available to you? \"   The patient is unsure. He will let her daughter who is a nurse decide  Resuscitation: \"In the event your heart stopped as a result of an underlying serious health condition, would you want attempts to be made to restart your heart, or would you prefer a natural death? \"   The patient is unsure.   As above    Additional topics discussed: end of life care preferences (vegetative state/imminent death)    Conversation Outcomes / Follow-Up Plan:   ACP in process - information provided, considering goals and options  Reviewed DNR/DNI and patient elects Full Code (Attempt Resuscitation)     Length of Voluntary ACP Conversation in minutes:  <16 minutes (Non-Billable)    Justina Lainez MD

## 2021-02-25 NOTE — PROGRESS NOTES
Chief Complaint   Patient presents with    Annual Wellness Visit    Follow-up       Pt is a 80y.o. year old male who presents for follow up of his chronic medical problems    Saw Hand surgeon-hand injections for trigger finger-now able to move the fingers/no more clicking    Urology-feels much better,urinay freq improved quite a bit; PSA up but expected for his age  Lab Results   Component Value Date/Time    Prostate Specific Ag 9.54 (H) 2021 03:32 PM    Prostate Specific Ag 8.44 (H) 2020 04:20 PM    Prostate Specific Ag 11.33 (H) 2020 04:14 PM    Prostate Specific Ag 10.3 (H) 2017 10:23 AM    Prostate Specific Ag 5.3 (A) 04/15/2015    Prostate Specific Ag 5.0 (A) 10/02/2014     Cardio-had blood/urine test done-he has not heard back from them about the results  Vital Sign Reading Time Taken Comments   Blood Pressure 137/83 2021 2:51 PM EST     Pulse 79 2021 2:51 PM EST     Temperature - -     Respiratory Rate - -     Oxygen Saturation - -     Inhaled Oxygen Concentration - -     Weight 69.4 kg (153 lb) 2021 2:51 PM EST     Height 167.6 cm (5' 6\") 2021 2:51 PM EST     Body Mass Index 24.69 2021 2:51 PM EST       Progress Notes  - documented in this encounter  Christopher Hernandez MD - 2021 2:40 PM EST  Formatting of this note might be different from the original.  [facility]  Office Visit Note  Dr. Matilda Villalta, [facility], [facility], RV[facility]    (Note to patient:The purpose of this note is to communicate optimally with the other providers involved in your care. It is written using standard medical terminology. The Dragon dictation tool was used in preparation of this note, so please excuse typos/grammatical errors.  If you have questions regarding details of the note please call my office at [phone] and make an appointment to discuss your concerns.)    Patient: Nayely Lopez   : 7/10/1932   MRN: [id number]   2021, 3:03 PM Identifier:  Chun Germain is a 80 y.o. male seen for Follow-Up Office Visit    I seen him last year with consideration of amyloid, plus bradycardia. It does not appear that he ever had a pyrophosphate scan, and thus he has been conservatively managed in the absence of symptoms. He never pursued the UPEP for SPEP that was ordered, as he was trying to stay away from hospitals in the setting of Covid. I reordered it was talked about pursuing it and depending on the results pursuing a pyrophosphate scan. Impressions/Recommendations:  Mr. Monserrat Lopez was seen 2/4/2021 for the following conditions, with complex medical-decision making as outlined, and with recommendations as follows:  1. Essential hypertension  - PROTEIN ELECTRO W/INTERP; Future  - PROTEIN ELECTRO, RANDOM URINE; Future    1. Essential hypertension plan: Well-controlled on lisinopril with no active symptoms of angina, dyspnea, syncope  2. Bradycardia without symptoms yet echocardiogram suggesting possible amyloid   a. plan: We will try to get UPEP and SPEP over this next 6 months. Depending on results recommended a pyrophosphate scan. No active decision regarding therapy at this point in time. Return in about 6 months (around 8/4/2021) for UPEP and SPEP ordered.  .    HR on his phone-60-70  Home BPs normal    Derm took a lesion on the right pbkyfiy-ypimjruizrjn-hkrxvr up in 30 days    Has been waking up at 4 and having difficulty going back to sleep -taking OTC Unisom, has been taking it for yrs  Last few days depressed bec best friend had bad accident    BP Readings from Last 3 Encounters:   02/25/21 136/84   11/19/20 130/80   05/02/20 127/79   repeat BP-    Mid sentence he forgets sometimes     Congestion in his throat-daily; same all throughout the day; whitish secretions  Has not tried any OTC meds for this    ROS:    Pt denies: Wt loss, Fever/Chills, HA, Visual changes, Fatigue, Chest pain, SOB, PENA, Abd pain, N/V/D/C, Blood in stool or urine, Edema. Pertinent positive as above in HPI. All others were negative    Patient Active Problem List   Diagnosis Code    OAB (overactive bladder) N32.81    Essential hypertension with goal blood pressure less than 140/90 I10    Gastroesophageal reflux disease K21.9    Allergic rhinitis J30.9    Benign non-nodular prostatic hyperplasia N40.0    Diastasis recti M62.08    S/p bilateral carpal tunnel release Z98.890    Advance directive discussed with patient Z71.89    Osteoarthritis of finger M19.049    Carpal tunnel syndrome G56.00    Symptomatic sinus bradycardia R00.1       Past Medical History:   Diagnosis Date    BPH (benign prostatic hypertrophy)     Carpal tunnel syndrome     GERD (gastroesophageal reflux disease)     Hypertension     OAB (overactive bladder)        Current Outpatient Medications   Medication Sig Dispense Refill    lisinopriL (PRINIVIL, ZESTRIL) 20 mg tablet Take 1 Tab by mouth daily. 90 Tab 3    amLODIPine (NORVASC) 5 mg tablet Take 1 Tab by mouth daily. 90 Tab 1    mirabegron ER (Myrbetriq) 50 mg ER tablet Take 1 Tab by mouth daily. 90 Tab 3    solifenacin (VESICARE) 5 mg tablet take 1 tablet by mouth once daily 90 Tab 2    Omeprazole delayed release (PRILOSEC D/R) 20 mg tablet Take 1 Tab by mouth daily. 90 Tab 1    ibuprofen 200 mg cap Take 400 mg by mouth two (2) times a day.  aspirin delayed-release 81 mg tablet Take  by mouth daily.          Social History     Tobacco Use   Smoking Status Former Smoker    Years: 15.00    Types: Cigarettes    Quit date: 1965    Years since quittin.2   Smokeless Tobacco Former User       No Known Allergies    Patient Labs were reviewed: yes      Patient Past Records were reviewed:  yes        Objective:     Vitals:    21 0859 21 0925 21 0954   BP: (!) 149/99 (!) 142/84 136/84   Pulse: (!) 50     Resp: 15     Temp: 98.7 °F (37.1 °C)     TempSrc: Temporal     SpO2: 97%     Weight: 151 lb (68.5 kg) Height: 5' 6\" (1.676 m)       Body mass index is 24.37 kg/m². Exam:   Appearance: alert, well appearing,  oriented to person, place, and time, acyanotic, in no respiratory distress and well hydrated. HEENT:  NC/AT, pink conj, anicteric sclerae, hearing aids bilaterally  Neck:  No cervical lymphadenopathy, no JVD, no thyromegaly, no carotid bruit  Heart:  RRR without M/R/G  Lungs:  CTAB, no rhonchi, rales, or wheezes with good air exchange   Abdomen:  Non-tender, pos bowel sounds, no hepatosplenomegaly  Ext:  No C/C/E    Skin: no rash  Neuro: no lateralizing signs, CNs II-XII intact      Assessment/ Plan:   Diagnoses and all orders for this visit:    1. Medicare annual wellness visit, subsequent-see note below    2. Post-nasal drip--liklely the cause of his daily throat congestion; will have him try     fluticasone propionate (FLONASE) 50 mcg/actuation nasal spray; 1 [place] by Both Nostrils route daily. 3. Essential hypertension with goal blood pressure less than 140/90-at goal on current med, continue with home monitoring    4. Garcia's esophagus without dysplasia-continue with daily PPI; Gi following, EGD is recent    5. OAB (overactive bladder)-doing better on current meds    6. Elevated PSA-repeat in 6 months to see if it is trending down    7. Trigger middle finger of left hand-s/p recent inj from hand surgery with good results    8. 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 Odmeetklvgm-gxhpgukvjbtw-ixoli worked up by Fro[facility] for possible amyloid    Follow-up and Dispositions    · Return in about 3 months (around 5/25/2021) for follow up-in office, fasting labs. I have discussed the diagnosis with the patient and the intended plan as seen in the above orders. The patient has received an After-Visit Summary and questions were answered concerning future plans.      Medication Side Effects and Warnings were discussed with patient: yes    Patient verbalized understanding of above instructions. Peace Bean MD  Internal Medicine  [facility]    This is the Subsequent Medicare Annual Wellness Exam, performed 12 months or more after the Initial AWV or the last Subsequent AWV    I have reviewed the patient's medical history in detail and updated the computerized patient record. Depression Risk Factor Screening:     3 most recent PHQ Screens 2/25/2021   Little interest or pleasure in doing things Not at all   Feeling down, depressed, irritable, or hopeless Not at all   Total Score PHQ 2 0       Alcohol Risk Screen    Do you average more than 1 drink per night or more than 7 drinks a week: Yes    In the past three months have you have had more than 4 drinks containing alcohol on one occasion: Yes    Glass of wine nightly    Functional Ability and Level of Safety:    Hearing: The patient wears hearing aids. Activities of Daily Living: The home contains: handrails and grab bars  Patient does total self care      Ambulation: with no difficulty     Fall Risk:  Fall Risk Assessment, last 12 mths 2/25/2021   Able to walk? Yes   Fall in past 12 months?  0      Abuse Screen:  Patient is not abused       Cognitive Screening    Has your family/caregiver stated any concerns about your memory: no except for occasional episodes of forgetting what he is about to say mid sentence; has not gotten lost while driving     Cognitive Screening: Normal - Clock Drawing Test    Assessment/Plan   Education and counseling provided:  Are appropriate based on today's review and evaluation  End-of-Life planning (with patient's consent)-see ACP note  Pneumococcal Vaccine-done  Influenza Vaccine-done  Prostate cancer screening tests (PSA, covered annually)-repeat in 6 months c/o Urology  Cardiovascular screening blood test-fasting lipids next visit  Screening for glaucoma-eye exam is up to date  Diabetes screening test-FBs next visit    Diagnoses and all orders for this visit:    1. Medicare annual wellness visit, subsequent-Refer to above for plan and to patient instructions for recommendations on HM    2. Advanced directives, counseling/discussion      RTC yearly for wellness visit      Health Maintenance Due     Health Maintenance Due   Topic Date Due    DTaP/Tdap/Td series (1 - Tdap) 07/10/1953    Shingrix Vaccine Age 50> (1 of 2) 07/10/1982    Pneumococcal 65+ years (1 of 1 - PPSV23) 07/10/1997       Patient Care Team   Patient Care Team:  Shaun Sandra MD as PCP - General (Internal Medicine)  Shaun Sandra MD as PCP - [address] [place] [address] 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 Aye Mercado     Current Outpatient Medications   Medication Sig Dispense Refill    fluticasone propionate (FLONASE) 50 mcg/actuation nasal spray 1 [place] by Both Nostrils route daily.  1 Bottle 3    lisinopriL (PRINIVIL, ZESTRIL) 20 mg tablet Take 1 Tab by mouth daily. 90 Tab 3    amLODIPine (NORVASC) 5 mg tablet Take 1 Tab by mouth daily. 90 Tab 1    mirabegron ER (Myrbetriq) 50 mg ER tablet Take 1 Tab by mouth daily. 90 Tab 3    solifenacin (VESICARE) 5 mg tablet take 1 tablet by mouth once daily 90 Tab 2    Omeprazole delayed release (PRILOSEC D/R) 20 mg tablet Take 1 Tab by mouth daily. 90 Tab 1    ibuprofen 200 mg cap Take 400 mg by mouth two (2) times a day.  aspirin delayed-release 81 mg tablet Take  by mouth daily.        No Known Allergies    Family History   Family history unknown: Yes     Social History     Tobacco Use    Smoking status: Former Smoker     Years: 15.00     Types: Cigarettes     Quit date: 1965     Years since quittin.2    Smokeless tobacco: Former User   Substance Use Topics    Alcohol use: Yes     Frequency: Monthly or less     Drinks per session: 1 or 2     Comment: rarely; socially

## 2021-02-25 NOTE — PATIENT INSTRUCTIONS
Medicare Wellness Visit, Male    The best way to live healthy is to have a lifestyle where you eat a well-balanced diet, exercise regularly, limit alcohol use, and quit all forms of tobacco/nicotine, if applicable. Regular preventive services are another way to keep healthy. Preventive services (vaccines, screening tests, monitoring & exams) can help personalize your care plan, which helps you manage your own care. Screening tests can find health problems at the earliest stages, when they are easiest to treat. [facility] follows the current, evidence-based guidelines published by the [facility] ([facility]) when recommending preventive services for our patients. Because we follow these guidelines, sometimes recommendations change over time as research supports it. (For example, a prostate screening blood test is no longer routinely recommended for men with no symptoms). Of course, you and your doctor may decide to screen more often for some diseases, based on your risk and co-morbidities (chronic disease you are already diagnosed with). Preventive services for you include:  - Medicare offers their members a free annual wellness visit, which is time for you and your primary care provider to discuss and plan for your preventive service needs. Take advantage of this benefit every year!  -All adults over age 72 should receive the recommended pneumonia vaccines. Current USPSTF guidelines recommend a series of two vaccines for the best pneumonia protection.   -All adults should have a flu vaccine yearly and tetanus vaccine every 10 years.  -All adults age 48 and older should receive the shingles vaccines (series of two vaccines).        -All adults age 38-68 who are overweight should have a diabetes screening test once every three years.   -Other screening tests & preventive services for persons with diabetes include: an eye exam to screen for diabetic retinopathy, a kidney function test, a foot exam, and stricter control over your cholesterol.   -Cardiovascular screening for adults with routine risk involves an electrocardiogram (ECG) at intervals determined by the provider.   -Colorectal cancer screening should be done for adults age 54-65 with no increased risk factors for colorectal cancer. There are a number of acceptable methods of screening for this type of cancer. Each test has its own benefits and drawbacks. Discuss with your provider what is most appropriate for you during your annual wellness visit. The different tests include: colonoscopy (considered the best screening method), a fecal occult blood test, a fecal DNA test, and sigmoidoscopy.  -All adults born between [facility] should be screened once for Hepatitis C.  -An Abdominal Aortic Aneurysm (AAA) Screening is recommended for men age 73-68 who has ever smoked in their lifetime.      Here is a list of your current Health Maintenance items (your personalized list of preventive services) with a due date:  Health Maintenance Due   Topic Date Due    DTaP/Tdap/Td  (1 - Tdap) 07/10/1953    Shingles Vaccine (1 of 2) 07/10/1982    Pneumococcal Vaccine (1 of 1 - PPSV23) 07/10/1997

## 2023-04-04 NOTE — PROGRESS NOTES
Chief Complaint   Patient presents with    Annual Wellness Visit    Follow Up Chronic Condition     HTN 3 month; patient is fasting     1. Have you been to the ER, urgent care clinic since your last visit? Hospitalized since your last visit? No    2. Have you seen or consulted any other health care providers outside of the [address] [place] [address] since your last visit? Include any pap smears or colon screening.  Yes Where: [facility] Detail Level: Zone